# Patient Record
Sex: FEMALE | Race: BLACK OR AFRICAN AMERICAN | Employment: PART TIME | URBAN - METROPOLITAN AREA
[De-identification: names, ages, dates, MRNs, and addresses within clinical notes are randomized per-mention and may not be internally consistent; named-entity substitution may affect disease eponyms.]

---

## 2017-01-09 ENCOUNTER — OFFICE VISIT (OUTPATIENT)
Dept: SURGERY | Age: 27
End: 2017-01-09

## 2017-01-09 VITALS
DIASTOLIC BLOOD PRESSURE: 62 MMHG | BODY MASS INDEX: 24.59 KG/M2 | WEIGHT: 144 LBS | TEMPERATURE: 97.8 F | SYSTOLIC BLOOD PRESSURE: 100 MMHG | HEIGHT: 64 IN

## 2017-01-09 DIAGNOSIS — Z09 SURGERY FOLLOW-UP: Primary | ICD-10-CM

## 2017-01-09 PROCEDURE — 99024 POSTOP FOLLOW-UP VISIT: CPT | Performed by: SURGERY

## 2017-03-06 ENCOUNTER — OFFICE VISIT (OUTPATIENT)
Dept: INTERNAL MEDICINE | Age: 27
End: 2017-03-06

## 2017-03-06 VITALS
TEMPERATURE: 98.3 F | BODY MASS INDEX: 25.13 KG/M2 | HEART RATE: 87 BPM | WEIGHT: 147.2 LBS | DIASTOLIC BLOOD PRESSURE: 54 MMHG | OXYGEN SATURATION: 99 % | SYSTOLIC BLOOD PRESSURE: 108 MMHG | HEIGHT: 64 IN

## 2017-03-06 DIAGNOSIS — K42.9 UMBILICAL HERNIA WITHOUT OBSTRUCTION AND WITHOUT GANGRENE: Primary | ICD-10-CM

## 2017-03-06 DIAGNOSIS — Z11.4 SCREENING FOR HIV (HUMAN IMMUNODEFICIENCY VIRUS): ICD-10-CM

## 2017-03-06 DIAGNOSIS — Z23 NEED FOR TDAP VACCINATION: ICD-10-CM

## 2017-03-06 PROCEDURE — 90471 IMMUNIZATION ADMIN: CPT | Performed by: PHYSICIAN ASSISTANT

## 2017-03-06 PROCEDURE — 99203 OFFICE O/P NEW LOW 30 MIN: CPT | Performed by: PHYSICIAN ASSISTANT

## 2017-03-06 PROCEDURE — 90715 TDAP VACCINE 7 YRS/> IM: CPT | Performed by: PHYSICIAN ASSISTANT

## 2017-03-06 ASSESSMENT — ENCOUNTER SYMPTOMS
BLOOD IN STOOL: 0
HEARTBURN: 0
CONSTIPATION: 0
EYES NEGATIVE: 1
VOMITING: 0
DIARRHEA: 0
ABDOMINAL PAIN: 1
RESPIRATORY NEGATIVE: 1
NAUSEA: 0

## 2017-03-09 LAB — HIV-1 AND HIV-2 ANTIBODIES: NEGATIVE

## 2017-03-11 ENCOUNTER — OFFICE VISIT (OUTPATIENT)
Dept: INTERNAL MEDICINE | Age: 27
End: 2017-03-11

## 2017-03-11 VITALS
TEMPERATURE: 98.6 F | HEART RATE: 59 BPM | BODY MASS INDEX: 25.16 KG/M2 | OXYGEN SATURATION: 99 % | HEIGHT: 64 IN | WEIGHT: 147.4 LBS | SYSTOLIC BLOOD PRESSURE: 110 MMHG | DIASTOLIC BLOOD PRESSURE: 78 MMHG | RESPIRATION RATE: 16 BRPM

## 2017-03-11 DIAGNOSIS — D50.8 OTHER IRON DEFICIENCY ANEMIA: ICD-10-CM

## 2017-03-11 DIAGNOSIS — R68.82 DECREASED LIBIDO: Primary | ICD-10-CM

## 2017-03-11 DIAGNOSIS — E55.9 VITAMIN D DEFICIENCY: ICD-10-CM

## 2017-03-11 PROCEDURE — 99213 OFFICE O/P EST LOW 20 MIN: CPT | Performed by: PHYSICIAN ASSISTANT

## 2017-03-11 ASSESSMENT — PATIENT HEALTH QUESTIONNAIRE - PHQ9
1. LITTLE INTEREST OR PLEASURE IN DOING THINGS: 0
SUM OF ALL RESPONSES TO PHQ QUESTIONS 1-9: 0
SUM OF ALL RESPONSES TO PHQ9 QUESTIONS 1 & 2: 0
2. FEELING DOWN, DEPRESSED OR HOPELESS: 0

## 2017-03-11 ASSESSMENT — ENCOUNTER SYMPTOMS
VOMITING: 0
WHEEZING: 0
CONSTIPATION: 1
SHORTNESS OF BREATH: 0
DIARRHEA: 0
EYES NEGATIVE: 1
NAUSEA: 0
SORE THROAT: 0
COUGH: 0
ABDOMINAL PAIN: 0

## 2017-03-13 ENCOUNTER — OFFICE VISIT (OUTPATIENT)
Dept: SURGERY | Age: 27
End: 2017-03-13

## 2017-03-13 VITALS
DIASTOLIC BLOOD PRESSURE: 76 MMHG | TEMPERATURE: 97.9 F | BODY MASS INDEX: 24.92 KG/M2 | WEIGHT: 146 LBS | SYSTOLIC BLOOD PRESSURE: 112 MMHG | HEIGHT: 64 IN

## 2017-03-13 DIAGNOSIS — R68.82 DECREASED LIBIDO: ICD-10-CM

## 2017-03-13 DIAGNOSIS — E55.9 VITAMIN D DEFICIENCY: ICD-10-CM

## 2017-03-13 DIAGNOSIS — R10.33 PERIUMBILICAL ABDOMINAL PAIN: ICD-10-CM

## 2017-03-13 DIAGNOSIS — D50.8 OTHER IRON DEFICIENCY ANEMIA: ICD-10-CM

## 2017-03-13 DIAGNOSIS — R19.05 PERIUMBILICAL MASS: Primary | ICD-10-CM

## 2017-03-13 LAB
ESTRADIOL LEVEL: 50 PG/ML
FOLLICLE STIMULATING HORMONE: 4.2 MIU/ML
HCT VFR BLD CALC: 34.9 % (ref 37–47)
HEMOGLOBIN: 11.3 G/DL (ref 12–16)
LUTEINIZING HORMONE: 4 MIU/ML
MCH RBC QN AUTO: 27 PG (ref 27–31.3)
MCHC RBC AUTO-ENTMCNC: 32.4 % (ref 33–37)
MCV RBC AUTO: 83.2 FL (ref 82–100)
PDW BLD-RTO: 13.4 % (ref 11.5–14.5)
PLATELET # BLD: 283 K/UL (ref 130–400)
RBC # BLD: 4.19 M/UL (ref 4.2–5.4)
TSH SERPL DL<=0.05 MIU/L-ACNC: 2.26 UIU/ML (ref 0.27–4.2)
VITAMIN D 25-HYDROXY: 22.5 NG/ML (ref 30–100)
WBC # BLD: 4.1 K/UL (ref 4.8–10.8)

## 2017-03-13 PROCEDURE — 99213 OFFICE O/P EST LOW 20 MIN: CPT | Performed by: SURGERY

## 2017-03-15 LAB
SEX HORMONE BINDING GLOBULIN: 91 NMOL/L (ref 30–135)
TESTOSTERONE FREE: 1.2 PG/ML (ref 0.8–7.4)
TESTOSTERONE, FEMALES/CHILDREN: 14 NG/DL (ref 9–55)

## 2017-03-23 ENCOUNTER — HOSPITAL ENCOUNTER (OUTPATIENT)
Dept: CT IMAGING | Age: 27
Discharge: HOME | End: 2017-03-23
Payer: COMMERCIAL

## 2017-03-23 VITALS — BODY MASS INDEX: 24.75 KG/M2 | HEIGHT: 64 IN | WEIGHT: 145 LBS

## 2017-03-23 DIAGNOSIS — R19.05 PERIUMBILICAL MASS: ICD-10-CM

## 2017-03-23 PROCEDURE — 74177 CT ABD & PELVIS W/CONTRAST: CPT

## 2017-03-23 PROCEDURE — 6360000004 HC RX CONTRAST MEDICATION: Performed by: RADIOLOGY

## 2017-03-23 PROCEDURE — 2500000003 HC RX 250 WO HCPCS: Performed by: RADIOLOGY

## 2017-03-23 RX ADMIN — BARIUM SULFATE 450 ML: 21 SUSPENSION ORAL at 13:15

## 2017-03-23 RX ADMIN — IOPAMIDOL 100 ML: 755 INJECTION, SOLUTION INTRAVENOUS at 13:15

## 2017-04-03 ENCOUNTER — OFFICE VISIT (OUTPATIENT)
Dept: SURGERY | Age: 27
End: 2017-04-03

## 2017-04-03 VITALS
BODY MASS INDEX: 25.27 KG/M2 | DIASTOLIC BLOOD PRESSURE: 80 MMHG | HEIGHT: 64 IN | SYSTOLIC BLOOD PRESSURE: 118 MMHG | WEIGHT: 148 LBS | TEMPERATURE: 97.7 F

## 2017-04-03 DIAGNOSIS — R10.33 PERIUMBILICAL ABDOMINAL PAIN: Primary | ICD-10-CM

## 2017-04-03 PROCEDURE — 99213 OFFICE O/P EST LOW 20 MIN: CPT | Performed by: SURGERY

## 2017-06-08 ENCOUNTER — OFFICE VISIT (OUTPATIENT)
Dept: INTERNAL MEDICINE | Age: 27
End: 2017-06-08

## 2017-06-08 VITALS
SYSTOLIC BLOOD PRESSURE: 128 MMHG | HEIGHT: 64 IN | HEART RATE: 68 BPM | RESPIRATION RATE: 16 BRPM | WEIGHT: 151 LBS | TEMPERATURE: 98.3 F | BODY MASS INDEX: 25.78 KG/M2 | DIASTOLIC BLOOD PRESSURE: 78 MMHG

## 2017-06-08 DIAGNOSIS — H65.91 MIDDLE EAR EFFUSION, RIGHT: Primary | ICD-10-CM

## 2017-06-08 DIAGNOSIS — J30.2 SEASONAL ALLERGIC RHINITIS, UNSPECIFIED ALLERGIC RHINITIS TRIGGER: ICD-10-CM

## 2017-06-08 PROCEDURE — 99213 OFFICE O/P EST LOW 20 MIN: CPT | Performed by: PHYSICIAN ASSISTANT

## 2017-06-08 ASSESSMENT — ENCOUNTER SYMPTOMS
EYE PAIN: 0
SORE THROAT: 0
WHEEZING: 0
DIARRHEA: 0
VOMITING: 0
SHORTNESS OF BREATH: 0
HEARTBURN: 1
COUGH: 0
NAUSEA: 0
BLURRED VISION: 0
BACK PAIN: 0

## 2017-09-15 ENCOUNTER — OFFICE VISIT (OUTPATIENT)
Dept: INTERNAL MEDICINE | Age: 27
End: 2017-09-15

## 2017-09-15 VITALS
BODY MASS INDEX: 25.47 KG/M2 | HEART RATE: 53 BPM | WEIGHT: 149.2 LBS | SYSTOLIC BLOOD PRESSURE: 100 MMHG | OXYGEN SATURATION: 99 % | TEMPERATURE: 98.2 F | HEIGHT: 64 IN | DIASTOLIC BLOOD PRESSURE: 70 MMHG | RESPIRATION RATE: 16 BRPM

## 2017-09-15 DIAGNOSIS — K21.9 GASTROESOPHAGEAL REFLUX DISEASE WITHOUT ESOPHAGITIS: ICD-10-CM

## 2017-09-15 DIAGNOSIS — E55.9 VITAMIN D DEFICIENCY: ICD-10-CM

## 2017-09-15 DIAGNOSIS — K58.0 IRRITABLE BOWEL SYNDROME WITH DIARRHEA: Primary | ICD-10-CM

## 2017-09-15 DIAGNOSIS — D50.9 IRON DEFICIENCY ANEMIA, UNSPECIFIED IRON DEFICIENCY ANEMIA TYPE: ICD-10-CM

## 2017-09-15 DIAGNOSIS — F43.0 PANIC ATTACK AS REACTION TO STRESS: ICD-10-CM

## 2017-09-15 DIAGNOSIS — F41.0 PANIC ATTACK AS REACTION TO STRESS: ICD-10-CM

## 2017-09-15 DIAGNOSIS — R11.0 NAUSEA: ICD-10-CM

## 2017-09-15 DIAGNOSIS — H65.06 RECURRENT ACUTE SEROUS OTITIS MEDIA OF BOTH EARS: ICD-10-CM

## 2017-09-15 PROBLEM — J30.2 SEASONAL ALLERGIC RHINITIS: Status: ACTIVE | Noted: 2017-09-15

## 2017-09-15 PROCEDURE — 99214 OFFICE O/P EST MOD 30 MIN: CPT | Performed by: NURSE PRACTITIONER

## 2017-09-15 RX ORDER — ONDANSETRON 8 MG/1
8 TABLET, ORALLY DISINTEGRATING ORAL EVERY 8 HOURS PRN
Qty: 60 TABLET | Refills: 0 | Status: SHIPPED | OUTPATIENT
Start: 2017-09-15 | End: 2018-08-27 | Stop reason: SDUPTHER

## 2017-09-15 RX ORDER — DICYCLOMINE HCL 20 MG
20 TABLET ORAL 4 TIMES DAILY PRN
Qty: 120 TABLET | Refills: 3 | Status: SHIPPED | OUTPATIENT
Start: 2017-09-15 | End: 2020-01-10

## 2017-09-15 RX ORDER — CETIRIZINE HYDROCHLORIDE 10 MG/1
10 TABLET ORAL DAILY
Qty: 30 TABLET | Refills: 3 | Status: SHIPPED | OUTPATIENT
Start: 2017-09-15 | End: 2018-08-27

## 2017-09-15 RX ORDER — ERGOCALCIFEROL 1.25 MG/1
50000 CAPSULE ORAL WEEKLY
Qty: 8 CAPSULE | Refills: 0 | Status: SHIPPED | OUTPATIENT
Start: 2017-09-15 | End: 2018-08-27 | Stop reason: ALTCHOICE

## 2017-09-15 RX ORDER — PANTOPRAZOLE SODIUM 20 MG/1
20 TABLET, DELAYED RELEASE ORAL DAILY
Qty: 30 TABLET | Refills: 3 | Status: SHIPPED | OUTPATIENT
Start: 2017-09-15 | End: 2020-01-10

## 2017-09-15 ASSESSMENT — ENCOUNTER SYMPTOMS
BLOATING: 0
BLOOD IN STOOL: 0
DIARRHEA: 1
SWOLLEN GLANDS: 0
COUGH: 0
ABDOMINAL PAIN: 1
SORE THROAT: 0
FLATUS: 0
STRIDOR: 0
CHEST TIGHTNESS: 0
CONSTIPATION: 0
CHANGE IN BOWEL HABIT: 1
ANAL BLEEDING: 0
NAUSEA: 1
RECTAL PAIN: 0
VISUAL CHANGE: 0
VOMITING: 1
ABDOMINAL DISTENTION: 1

## 2017-09-22 ENCOUNTER — OFFICE VISIT (OUTPATIENT)
Dept: FAMILY MEDICINE CLINIC | Age: 27
End: 2017-09-22

## 2017-09-22 VITALS
TEMPERATURE: 98.6 F | RESPIRATION RATE: 16 BRPM | HEIGHT: 64 IN | WEIGHT: 150 LBS | HEART RATE: 74 BPM | DIASTOLIC BLOOD PRESSURE: 64 MMHG | BODY MASS INDEX: 25.61 KG/M2 | SYSTOLIC BLOOD PRESSURE: 98 MMHG | OXYGEN SATURATION: 98 %

## 2017-09-22 DIAGNOSIS — L25.9 CONTACT DERMATITIS, UNSPECIFIED CONTACT DERMATITIS TYPE, UNSPECIFIED TRIGGER: Primary | ICD-10-CM

## 2017-09-23 ASSESSMENT — ENCOUNTER SYMPTOMS
PERI-ORBITAL EDEMA: 0
SORE THROAT: 0
SHORTNESS OF BREATH: 0
ABDOMINAL PAIN: 0
THROAT SWELLING: 0
WHEEZING: 0
DIARRHEA: 0
NAUSEA: 0
HOARSE VOICE: 0
VOMITING: 0

## 2017-10-16 ENCOUNTER — OFFICE VISIT (OUTPATIENT)
Dept: INTERNAL MEDICINE | Age: 27
End: 2017-10-16

## 2017-10-16 VITALS
DIASTOLIC BLOOD PRESSURE: 72 MMHG | WEIGHT: 150.8 LBS | TEMPERATURE: 98.5 F | HEART RATE: 75 BPM | OXYGEN SATURATION: 99 % | SYSTOLIC BLOOD PRESSURE: 110 MMHG | HEIGHT: 64 IN | BODY MASS INDEX: 25.74 KG/M2 | RESPIRATION RATE: 16 BRPM

## 2017-10-16 DIAGNOSIS — K21.9 GASTROESOPHAGEAL REFLUX DISEASE WITHOUT ESOPHAGITIS: Primary | ICD-10-CM

## 2017-10-16 DIAGNOSIS — K58.2 IRRITABLE BOWEL SYNDROME WITH BOTH CONSTIPATION AND DIARRHEA: ICD-10-CM

## 2017-10-16 PROCEDURE — 99213 OFFICE O/P EST LOW 20 MIN: CPT | Performed by: PHYSICIAN ASSISTANT

## 2017-10-16 ASSESSMENT — ENCOUNTER SYMPTOMS
BLURRED VISION: 0
SORE THROAT: 0
CONSTIPATION: 0
EYE PAIN: 0
EYE DISCHARGE: 0
VOMITING: 0
SHORTNESS OF BREATH: 0
ABDOMINAL PAIN: 0
BACK PAIN: 0
DIARRHEA: 0
WHEEZING: 0
COUGH: 0
EYE REDNESS: 0
NAUSEA: 0
HEARTBURN: 0

## 2017-10-16 NOTE — PROGRESS NOTES
Occupational History    Not on file. Social History Main Topics    Smoking status: Never Smoker    Smokeless tobacco: Never Used    Alcohol use Yes      Comment: very rarely    Drug use: No    Sexual activity: Yes     Other Topics Concern    Not on file     Social History Narrative    No narrative on file     Family History   Problem Relation Age of Onset    Other Father     No Known Problems Sister     No Known Problems Son     No Known Problems Daughter     No Known Problems Son     No Known Problems Daughter      No Known Allergies  Current Outpatient Prescriptions   Medication Sig Dispense Refill    dicyclomine (BENTYL) 20 MG tablet Take 1 tablet by mouth 4 times daily as needed (diarrhea, abdominal cramping) 120 tablet 3    pantoprazole (PROTONIX) 20 MG tablet Take 1 tablet by mouth daily 30 tablet 3    ondansetron (ZOFRAN-ODT) 8 MG disintegrating tablet Take 1 tablet by mouth every 8 hours as needed for Nausea or Vomiting 60 tablet 0    cetirizine (ZYRTEC) 10 MG tablet Take 1 tablet by mouth daily 30 tablet 3    vitamin D (ERGOCALCIFEROL) 76458 units CAPS capsule Take 1 capsule by mouth once a week 8 capsule 0    [START ON 11/12/2017] Cholecalciferol (VITAMIN D3) 5000 units CAPS Take 1 capsule by mouth daily 30 capsule 5    iron polysaccharide complex-B12-folic acid (FERREX-FORTE) 150-0.025-1 MG CAPS capsule Take 1 capsule by mouth daily (with breakfast) 30 capsule 5     No current facility-administered medications for this visit. Objective    Vitals:    10/16/17 1007   BP: 110/72   Site: Left Arm   Position: Sitting   Cuff Size: Medium Adult   Pulse: 75   Resp: 16   Temp: 98.5 °F (36.9 °C)   TempSrc: Oral   SpO2: 99%   Weight: 150 lb 12.8 oz (68.4 kg)   Height: 5' 3.5\" (1.613 m)     Physical Exam   Constitutional: She is oriented to person, place, and time and well-developed, well-nourished, and in no distress.    Cardiovascular: Normal rate, regular rhythm and normal heart sounds. Pulmonary/Chest: Effort normal and breath sounds normal.   Abdominal: Soft. Bowel sounds are normal. There is no tenderness. Neurological: She is alert and oriented to person, place, and time. Assessment & Plan   1. Gastroesophageal reflux disease without esophagitis     2. Irritable bowel syndrome with both constipation and diarrhea       Increase water and fiber intake    No orders of the defined types were placed in this encounter. No orders of the defined types were placed in this encounter. There are no discontinued medications. Return if symptoms worsen or fail to improve.     Urszula Holland PA-C

## 2018-02-23 ENCOUNTER — OFFICE VISIT (OUTPATIENT)
Dept: FAMILY MEDICINE CLINIC | Age: 28
End: 2018-02-23
Payer: COMMERCIAL

## 2018-02-23 VITALS
RESPIRATION RATE: 16 BRPM | DIASTOLIC BLOOD PRESSURE: 62 MMHG | TEMPERATURE: 99.1 F | OXYGEN SATURATION: 98 % | BODY MASS INDEX: 24.59 KG/M2 | WEIGHT: 144 LBS | HEIGHT: 64 IN | SYSTOLIC BLOOD PRESSURE: 106 MMHG | HEART RATE: 92 BPM

## 2018-02-23 DIAGNOSIS — J10.1 INFLUENZA A: Primary | ICD-10-CM

## 2018-02-23 LAB
INFLUENZA A ANTIBODY: POSITIVE
INFLUENZA B ANTIBODY: NEGATIVE

## 2018-02-23 PROCEDURE — 99213 OFFICE O/P EST LOW 20 MIN: CPT | Performed by: NURSE PRACTITIONER

## 2018-02-23 PROCEDURE — 87804 INFLUENZA ASSAY W/OPTIC: CPT | Performed by: NURSE PRACTITIONER

## 2018-02-23 RX ORDER — OSELTAMIVIR PHOSPHATE 75 MG/1
75 CAPSULE ORAL 2 TIMES DAILY
Qty: 10 CAPSULE | Refills: 0 | Status: SHIPPED | OUTPATIENT
Start: 2018-02-23 | End: 2018-02-28

## 2018-02-23 RX ORDER — BENZONATATE 100 MG/1
100 CAPSULE ORAL 3 TIMES DAILY PRN
Qty: 21 CAPSULE | Refills: 0 | Status: SHIPPED | OUTPATIENT
Start: 2018-02-23 | End: 2018-03-02

## 2018-02-23 ASSESSMENT — ENCOUNTER SYMPTOMS
WHEEZING: 1
SINUS PRESSURE: 1
SHORTNESS OF BREATH: 1
HEMOPTYSIS: 0
HEARTBURN: 0
RHINORRHEA: 1
DIARRHEA: 0
VOMITING: 0
ABDOMINAL DISTENTION: 0
CONSTIPATION: 0
SORE THROAT: 0
SINUS PAIN: 0
NAUSEA: 1
CHEST TIGHTNESS: 0
TROUBLE SWALLOWING: 0
COUGH: 1
ABDOMINAL PAIN: 0

## 2018-02-23 NOTE — PROGRESS NOTES
Social History Main Topics    Smoking status: Never Smoker    Smokeless tobacco: Never Used    Alcohol use Yes      Comment: very rarely    Drug use: No    Sexual activity: Yes     Other Topics Concern    Not on file     Social History Narrative    No narrative on file     Family History   Problem Relation Age of Onset    Other Father     No Known Problems Sister     No Known Problems Son     No Known Problems Daughter     No Known Problems Son     No Known Problems Daughter      No Known Allergies  Current Outpatient Prescriptions   Medication Sig Dispense Refill    benzonatate (TESSALON) 100 MG capsule Take 1 capsule by mouth 3 times daily as needed for Cough 21 capsule 0    oseltamivir (TAMIFLU) 75 MG capsule Take 1 capsule by mouth 2 times daily for 5 days 10 capsule 0    cetirizine (ZYRTEC) 10 MG tablet Take 1 tablet by mouth daily 30 tablet 3    dicyclomine (BENTYL) 20 MG tablet Take 1 tablet by mouth 4 times daily as needed (diarrhea, abdominal cramping) 120 tablet 3    pantoprazole (PROTONIX) 20 MG tablet Take 1 tablet by mouth daily 30 tablet 3    ondansetron (ZOFRAN-ODT) 8 MG disintegrating tablet Take 1 tablet by mouth every 8 hours as needed for Nausea or Vomiting 60 tablet 0    vitamin D (ERGOCALCIFEROL) 82665 units CAPS capsule Take 1 capsule by mouth once a week 8 capsule 0    Cholecalciferol (VITAMIN D3) 5000 units CAPS Take 1 capsule by mouth daily 30 capsule 5    iron polysaccharide complex-B12-folic acid (FERREX-FORTE) 150-0.025-1 MG CAPS capsule Take 1 capsule by mouth daily (with breakfast) 30 capsule 5     No current facility-administered medications for this visit. PMH, Surgical Hx, Family Hx, and Social Hx reviewed and updated. Health Maintenance reviewed.     Objective    Vitals:    02/23/18 1046   BP: 106/62   Site: Right Arm   Position: Sitting   Cuff Size: Medium Adult   Pulse: 92   Resp: 16   Temp: 99.1 °F (37.3 °C)   TempSrc: Temporal   SpO2: 98%   Weight:

## 2018-08-27 ENCOUNTER — OFFICE VISIT (OUTPATIENT)
Dept: INTERNAL MEDICINE CLINIC | Age: 28
End: 2018-08-27
Payer: COMMERCIAL

## 2018-08-27 VITALS
RESPIRATION RATE: 16 BRPM | TEMPERATURE: 97.8 F | BODY MASS INDEX: 25.02 KG/M2 | DIASTOLIC BLOOD PRESSURE: 68 MMHG | HEIGHT: 63 IN | WEIGHT: 141.2 LBS | HEART RATE: 56 BPM | SYSTOLIC BLOOD PRESSURE: 116 MMHG | OXYGEN SATURATION: 99 %

## 2018-08-27 DIAGNOSIS — Z12.4 ENCOUNTER FOR PAPANICOLAOU SMEAR OF CERVIX: ICD-10-CM

## 2018-08-27 DIAGNOSIS — R11.0 NAUSEA: ICD-10-CM

## 2018-08-27 DIAGNOSIS — K58.0 IRRITABLE BOWEL SYNDROME WITH DIARRHEA: Primary | ICD-10-CM

## 2018-08-27 DIAGNOSIS — D50.9 IRON DEFICIENCY ANEMIA, UNSPECIFIED IRON DEFICIENCY ANEMIA TYPE: ICD-10-CM

## 2018-08-27 DIAGNOSIS — K21.9 GASTROESOPHAGEAL REFLUX DISEASE WITHOUT ESOPHAGITIS: ICD-10-CM

## 2018-08-27 DIAGNOSIS — R10.10 PAIN OF UPPER ABDOMEN: ICD-10-CM

## 2018-08-27 DIAGNOSIS — E53.8 B12 DEFICIENCY: ICD-10-CM

## 2018-08-27 DIAGNOSIS — E55.9 VITAMIN D DEFICIENCY: ICD-10-CM

## 2018-08-27 DIAGNOSIS — R10.2 PELVIC PAIN: ICD-10-CM

## 2018-08-27 DIAGNOSIS — Z00.00 ROUTINE PHYSICAL EXAMINATION: ICD-10-CM

## 2018-08-27 PROCEDURE — 99214 OFFICE O/P EST MOD 30 MIN: CPT | Performed by: PHYSICIAN ASSISTANT

## 2018-08-27 RX ORDER — OMEPRAZOLE 20 MG/1
20 CAPSULE, DELAYED RELEASE ORAL 2 TIMES DAILY
Qty: 60 CAPSULE | Refills: 3 | Status: SHIPPED | OUTPATIENT
Start: 2018-08-27 | End: 2020-01-10

## 2018-08-27 RX ORDER — PANTOPRAZOLE SODIUM 20 MG/1
20 TABLET, DELAYED RELEASE ORAL DAILY
Qty: 30 TABLET | Refills: 3 | Status: CANCELLED | OUTPATIENT
Start: 2018-08-27

## 2018-08-27 RX ORDER — ONDANSETRON 8 MG/1
8 TABLET, ORALLY DISINTEGRATING ORAL EVERY 8 HOURS PRN
Qty: 60 TABLET | Refills: 0 | Status: SHIPPED | OUTPATIENT
Start: 2018-08-27 | End: 2020-01-10

## 2018-08-27 ASSESSMENT — ENCOUNTER SYMPTOMS
SHORTNESS OF BREATH: 0
SORE THROAT: 0
WHEEZING: 0
VOMITING: 0
CONSTIPATION: 1
DIARRHEA: 1
COUGH: 0
ABDOMINAL PAIN: 0
BACK PAIN: 1
BLURRED VISION: 0
HEARTBURN: 0
NAUSEA: 1

## 2018-08-27 ASSESSMENT — PATIENT HEALTH QUESTIONNAIRE - PHQ9
SUM OF ALL RESPONSES TO PHQ QUESTIONS 1-9: 0
1. LITTLE INTEREST OR PLEASURE IN DOING THINGS: 0
SUM OF ALL RESPONSES TO PHQ QUESTIONS 1-9: 0
2. FEELING DOWN, DEPRESSED OR HOPELESS: 0
SUM OF ALL RESPONSES TO PHQ9 QUESTIONS 1 & 2: 0

## 2018-08-27 NOTE — PROGRESS NOTES
file.     Social History Main Topics    Smoking status: Never Smoker    Smokeless tobacco: Never Used    Alcohol use Yes      Comment: very rarely    Drug use: No    Sexual activity: Yes     Other Topics Concern    Not on file     Social History Narrative    No narrative on file     Family History   Problem Relation Age of Onset    Other Father     No Known Problems Sister     No Known Problems Son     No Known Problems Daughter     No Known Problems Son     No Known Problems Daughter      No Known Allergies  Current Outpatient Prescriptions   Medication Sig Dispense Refill    ondansetron (ZOFRAN-ODT) 8 MG TBDP disintegrating tablet Take 1 tablet by mouth every 8 hours as needed for Nausea or Vomiting 60 tablet 0    iron polysaccharide complex-B12-folic acid (FERREX-FORTE) 150-0.025-1 MG CAPS capsule Take 1 capsule by mouth daily (with breakfast) 30 capsule 5    vitamin D (D3-1000) 1000 units TABS tablet Take 1 tablet by mouth daily 30 tablet 5    omeprazole (PRILOSEC) 20 MG delayed release capsule Take 1 capsule by mouth 2 times daily 60 capsule 3    dicyclomine (BENTYL) 20 MG tablet Take 1 tablet by mouth 4 times daily as needed (diarrhea, abdominal cramping) 120 tablet 3    pantoprazole (PROTONIX) 20 MG tablet Take 1 tablet by mouth daily 30 tablet 3    Cholecalciferol (VITAMIN D3) 5000 units CAPS Take 1 capsule by mouth daily 30 capsule 5     No current facility-administered medications for this visit. Objective    Vitals:    08/27/18 0959   BP: 116/68   Site: Right Arm   Position: Sitting   Cuff Size: Medium Adult   Pulse: 56   Resp: 16   Temp: 97.8 °F (36.6 °C)   TempSrc: Oral   SpO2: 99%   Weight: 141 lb 3.2 oz (64 kg)   Height: 5' 3\" (1.6 m)     Physical Exam   Constitutional: She is well-developed, well-nourished, and in no distress. HENT:   Head: Normocephalic and atraumatic.    Right Ear: External ear normal.   Left Ear: External ear normal.   Nose: Nose normal.   Mouth/Throat: Oropharynx is clear and moist.   Eyes: Pupils are equal, round, and reactive to light. Conjunctivae and EOM are normal.   Neck: Normal range of motion. Neck supple. No thyromegaly present. Cardiovascular: Normal rate, regular rhythm and normal heart sounds. No murmur heard. Pulmonary/Chest: Effort normal and breath sounds normal.   Abdominal: Soft. Bowel sounds are normal.   Musculoskeletal: Normal range of motion. Lymphadenopathy:     She has no cervical adenopathy. Neurological: She is alert. Gait normal.   Skin: Skin is warm and dry. Psychiatric: Affect normal.            Assessment & Plan    Diagnosis Orders   1. Irritable bowel syndrome with diarrhea  Ambulatory referral to Gastroenterology    Symptomatic, RXs provided. Will reassess in 1 month to evaluate treatment. 2. Vitamin D deficiency  vitamin D (D3-1000) 1000 units TABS tablet    Vitamin D 25 Hydroxy    Replacement started based on blood work    3. Gastroesophageal reflux disease without esophagitis  Ambulatory referral to Gastroenterology    omeprazole (PRILOSEC) 20 MG delayed release capsule    see 1   4. Nausea  ondansetron (ZOFRAN-ODT) 8 MG TBDP disintegrating tablet    Ambulatory referral to Gastroenterology    see 1   5. Iron deficiency anemia, unspecified iron deficiency anemia type  iron polysaccharide complex-B12-folic acid (FERREX-FORTE) 150-0.025-1 MG CAPS capsule    see 6   6. Pelvic pain  US PELVIS COMPLETE    Amb External Referral To Ob-Gyn   7. Pain of upper abdomen     8. Encounter for Papanicolaou smear of cervix  US PELVIS COMPLETE    Amb External Referral To Ob-Gyn   9. B12 deficiency  Vitamin B12 & Folate   10.  Routine physical examination  Comprehensive Metabolic Panel    Lipid, Fasting    CBC With Auto Differential         Orders Placed This Encounter   Procedures    US PELVIS COMPLETE     Standing Status:   Future     Standing Expiration Date:   8/27/2019     Order Specific Question:   Reason for exam:     Answer: transvagnial if indicated    Vitamin D 25 Hydroxy     Standing Status:   Future     Standing Expiration Date:   8/27/2019    Vitamin B12 & Folate     Standing Status:   Future     Standing Expiration Date:   8/27/2019    Comprehensive Metabolic Panel     Standing Status:   Future     Standing Expiration Date:   8/27/2019    Lipid, Fasting     Standing Status:   Future     Standing Expiration Date:   8/27/2019    CBC With Auto Differential     Standing Status:   Future     Standing Expiration Date:   8/27/2019    Ambulatory referral to Gastroenterology     Referral Priority:   Routine     Referral Type:   Consult for Advice and Opinion     Referral Reason:   Specialty Services Required     Referred to Provider:   Rajesh Parra MD     Requested Specialty:   Gastroenterology     Number of Visits Requested:   1    Amb External Referral To Ob-Gyn     Referral Priority:   Routine     Referral Type:   Consult for Advice and Opinion     Requested Specialty:   Obstetrics & Gynecology     Number of Visits Requested:   1     Orders Placed This Encounter   Medications    ondansetron (ZOFRAN-ODT) 8 MG TBDP disintegrating tablet     Sig: Take 1 tablet by mouth every 8 hours as needed for Nausea or Vomiting     Dispense:  60 tablet     Refill:  0    iron polysaccharide complex-B12-folic acid (FERREX-FORTE) 150-0.025-1 MG CAPS capsule     Sig: Take 1 capsule by mouth daily (with breakfast)     Dispense:  30 capsule     Refill:  5    vitamin D (D3-1000) 1000 units TABS tablet     Sig: Take 1 tablet by mouth daily     Dispense:  30 tablet     Refill:  5    omeprazole (PRILOSEC) 20 MG delayed release capsule     Sig: Take 1 capsule by mouth 2 times daily     Dispense:  60 capsule     Refill:  3     Medications Discontinued During This Encounter   Medication Reason    cetirizine (ZYRTEC) 10 MG tablet Patient Choice    vitamin D (ERGOCALCIFEROL) 88516 units CAPS capsule Therapy completed    ondansetron (ZOFRAN-ODT) 8 MG disintegrating tablet REORDER    iron polysaccharide complex-B12-folic acid (FERREX-FORTE) 150-0.025-1 MG CAPS capsule REORDER     Return in about 1 year (around 8/27/2019), or if symptoms worsen or fail to improve, for call for results.   Keep f.u with ob-dgyn  Urszula Holland PA-C

## 2018-08-28 ENCOUNTER — OFFICE VISIT (OUTPATIENT)
Dept: GASTROENTEROLOGY | Age: 28
End: 2018-08-28
Payer: COMMERCIAL

## 2018-08-28 VITALS
DIASTOLIC BLOOD PRESSURE: 68 MMHG | SYSTOLIC BLOOD PRESSURE: 105 MMHG | BODY MASS INDEX: 24.8 KG/M2 | RESPIRATION RATE: 14 BRPM | HEIGHT: 63 IN | TEMPERATURE: 97.6 F | HEART RATE: 64 BPM | WEIGHT: 140 LBS

## 2018-08-28 DIAGNOSIS — K21.9 GASTROESOPHAGEAL REFLUX DISEASE WITHOUT ESOPHAGITIS: ICD-10-CM

## 2018-08-28 DIAGNOSIS — R12 HEARTBURN: Primary | ICD-10-CM

## 2018-08-28 PROCEDURE — 99203 OFFICE O/P NEW LOW 30 MIN: CPT | Performed by: INTERNAL MEDICINE

## 2018-08-28 NOTE — PROGRESS NOTES
Patricio Keenan  29 y.o. female  Referred by: Urszula Holland PA-C    Medicines, social history, past medical history, surgical history, and allergies have been reviewed and updated as needed. Chief Complaint   Patient presents with    Gastroesophageal Reflux    Heartburn         HPI:   Patient is a 29year old BF with significant reflux and heartburn. Omeprazole/Nexium have not helped that much. Protonix is more beneficial.  Patient also has periodic diarrhea that is usually related to the type of food. Patient has had an approximate 5 pound loss of weight over the past few months. Patient denies dysphagia. ROS:    CONSTITUTIONAL:  5 pound weight loss  EYES:  Negative  ENMT:  Headache  MUSCULOSKELETAL:  Negative  NEUROLOGICAL:  Negative  INTEGUMENTARY:  Negative  GASTROINTESTINAL:  See HPI  GENITOURINARY:  Negative  CARDIOVASCULAR:  Negative  RESPIRATORY:  Negative  HEM/LYMPH:  Negative  ENDOCRINE:  Negative  PSYCHIATRIC:  Anxiety  ALLERGIC/IMMUNO:  Negative  EXTREMITIES:  Negative  BREAST:  Negative        Physical Exam:  /68 (Site: Right Arm, Position: Sitting)   Pulse 64   Temp 97.6 °F (36.4 °C) (Temporal)   Resp 14   Ht 5' 3\" (1.6 m)   Wt 140 lb (63.5 kg)   LMP 08/11/2018   BMI 24.80 kg/m²   Constitutional:  Patient appears well nourished, well developed, and hydrated. Alert and oriented x3 and appropriate. Non icteric and not pale. Eyes:  Pupils equal and reactive. Oropharynx:  Unremarkable. Neck/Thyroid: Neck is supple. No palpable nodules. No carotid bruits. Thyroid is symmetrical, without thyromegaly, masses, or palpable nodules. Respiratory:  Normal to inspection. Lungs clear to auscultation and percussion. No wheezes rhonchi or rales. Cardiovascular:  Regular rate and rhythm. No murmurs, gallops, or rubs. Abdomen:  Soft, no tenderness and non-distended. No organomegaly. No masses. No rebound or guarding. Normal bowel sounds.   Integumentary:  The skin is unremarkable. No rashes. No suspicious lesions. Warm and dry. Neuro: Grossly intact. Cranial nerves, sensation and reflexes grossly intact. Musculoskeletal:  Unremarkable. Assessment:   Diagnosis Orders   1. Heartburn     2. Gastroesophageal reflux disease without esophagitis         Plan:  EGD 8/31/18      IVero, transcribed the above note for Dr Vernetta Goldberg. Electronically signed by Vero Macias 8/28/18 1:34 PM        I , Vernetta Goldberg, have read and agree with the above documentation.   Electronically signed by Vernetta Goldberg, M.D. 8/29/18 9:53 AM

## 2018-08-30 ENCOUNTER — ANESTHESIA EVENT (OUTPATIENT)
Dept: ENDOSCOPY | Age: 28
End: 2018-08-30
Payer: COMMERCIAL

## 2018-08-30 ENCOUNTER — TELEPHONE (OUTPATIENT)
Dept: INTERNAL MEDICINE CLINIC | Age: 28
End: 2018-08-30

## 2018-08-30 NOTE — ANESTHESIA PRE PROCEDURE
mouth daily 30 tablet 5    omeprazole (PRILOSEC) 20 MG delayed release capsule Take 1 capsule by mouth 2 times daily 60 capsule 3    dicyclomine (BENTYL) 20 MG tablet Take 1 tablet by mouth 4 times daily as needed (diarrhea, abdominal cramping) 120 tablet 3    pantoprazole (PROTONIX) 20 MG tablet Take 1 tablet by mouth daily 30 tablet 3    Cholecalciferol (VITAMIN D3) 5000 units CAPS Take 1 capsule by mouth daily 30 capsule 5       Allergies:  No Known Allergies    Problem List:    Patient Active Problem List   Diagnosis Code    Panic attack as reaction to stress F41.0, F43.0    Irritable bowel syndrome with diarrhea K58.0    Seasonal allergies J30.2    Vitamin D deficiency E55.9    Iron deficiency anemia D50.9       Past Medical History:        Diagnosis Date    Heart murmur     childhood    Umbilical hernia 6/9573       Past Surgical History:        Procedure Laterality Date    HERNIA REPAIR      TUBAL LIGATION      UMBILICAL HERNIA REPAIR N/A 12/2/2016    4.3 cm Ventralex mesh       Social History:    Social History   Substance Use Topics    Smoking status: Never Smoker    Smokeless tobacco: Never Used    Alcohol use Yes      Comment: very rarely                                Counseling given: Not Answered      Vital Signs (Current): There were no vitals filed for this visit.                                            BP Readings from Last 3 Encounters:   08/28/18 105/68   08/27/18 116/68   02/23/18 106/62       NPO Status:                                                                                 BMI:   Wt Readings from Last 3 Encounters:   08/28/18 140 lb (63.5 kg)   08/27/18 141 lb 3.2 oz (64 kg)   02/23/18 144 lb (65.3 kg)     There is no height or weight on file to calculate BMI.    CBC:   Lab Results   Component Value Date    WBC 4.1 03/13/2017    RBC 4.19 03/13/2017    HGB 11.3 03/13/2017    HCT 34.9 03/13/2017    MCV 83.2 03/13/2017    RDW 13.4 03/13/2017     03/13/2017 CMP:   Lab Results   Component Value Date     05/10/2016    K 4.4 05/10/2016     05/10/2016    CO2 22 05/10/2016    BUN 11 05/10/2016    CREATININE 0.50 05/10/2016    GFRAA >60.0 05/10/2016    LABGLOM >60.0 05/10/2016       POC Tests: No results for input(s): POCGLU, POCNA, POCK, POCCL, POCBUN, POCHEMO, POCHCT in the last 72 hours. Coags: No results found for: PROTIME, INR, APTT    HCG (If Applicable): No results found for: PREGTESTUR, PREGSERUM, HCG, HCGQUANT     ABGs: No results found for: PHART, PO2ART, JFX5KOJ, HVR6ZUA, BEART, H3WCEAFX     Type & Screen (If Applicable):  No results found for: [facility]    Anesthesia Evaluation  Patient summary reviewed and Nursing notes reviewed  Airway: Mallampati: II  TM distance: >3 FB   Neck ROM: full  Mouth opening: > = 3 FB Dental: normal exam         Pulmonary:Negative Pulmonary ROS and normal exam                               Cardiovascular:Negative CV ROS                      Neuro/Psych:   (+) psychiatric history:            GI/Hepatic/Renal:   (+) GERD:,           Endo/Other: Negative Endo/Other ROS                    Abdominal:           Vascular: negative vascular ROS. Anesthesia Plan      MAC     ASA 2       Induction: intravenous. Anesthetic plan and risks discussed with patient. Plan discussed with CRNA.                   JIA Harp - CRNA   8/30/2018

## 2018-08-31 ENCOUNTER — HOSPITAL ENCOUNTER (OUTPATIENT)
Age: 28
Setting detail: OUTPATIENT SURGERY
Discharge: HOME | End: 2018-08-31
Attending: INTERNAL MEDICINE | Admitting: INTERNAL MEDICINE
Payer: COMMERCIAL

## 2018-08-31 ENCOUNTER — ANESTHESIA (OUTPATIENT)
Dept: ENDOSCOPY | Age: 28
End: 2018-08-31
Payer: COMMERCIAL

## 2018-08-31 VITALS
SYSTOLIC BLOOD PRESSURE: 99 MMHG | OXYGEN SATURATION: 100 % | WEIGHT: 140 LBS | BODY MASS INDEX: 24.8 KG/M2 | HEART RATE: 72 BPM | TEMPERATURE: 97.8 F | HEIGHT: 63 IN | RESPIRATION RATE: 16 BRPM | DIASTOLIC BLOOD PRESSURE: 55 MMHG

## 2018-08-31 VITALS
OXYGEN SATURATION: 100 % | SYSTOLIC BLOOD PRESSURE: 100 MMHG | DIASTOLIC BLOOD PRESSURE: 64 MMHG | RESPIRATION RATE: 28 BRPM

## 2018-08-31 PROCEDURE — 7100000010 HC PHASE II RECOVERY - FIRST 15 MIN: Performed by: INTERNAL MEDICINE

## 2018-08-31 PROCEDURE — 43239 EGD BIOPSY SINGLE/MULTIPLE: CPT | Performed by: INTERNAL MEDICINE

## 2018-08-31 PROCEDURE — 3700000000 HC ANESTHESIA ATTENDED CARE: Performed by: INTERNAL MEDICINE

## 2018-08-31 PROCEDURE — 6370000000 HC RX 637 (ALT 250 FOR IP): Performed by: INTERNAL MEDICINE

## 2018-08-31 PROCEDURE — 88342 IMHCHEM/IMCYTCHM 1ST ANTB: CPT

## 2018-08-31 PROCEDURE — 88305 TISSUE EXAM BY PATHOLOGIST: CPT

## 2018-08-31 PROCEDURE — 3609017100 HC EGD: Performed by: INTERNAL MEDICINE

## 2018-08-31 PROCEDURE — 6360000002 HC RX W HCPCS: Performed by: NURSE ANESTHETIST, CERTIFIED REGISTERED

## 2018-08-31 PROCEDURE — 2500000003 HC RX 250 WO HCPCS: Performed by: NURSE ANESTHETIST, CERTIFIED REGISTERED

## 2018-08-31 PROCEDURE — 2580000003 HC RX 258: Performed by: NURSE ANESTHETIST, CERTIFIED REGISTERED

## 2018-08-31 PROCEDURE — 3700000001 HC ADD 15 MINUTES (ANESTHESIA): Performed by: INTERNAL MEDICINE

## 2018-08-31 PROCEDURE — 2580000003 HC RX 258: Performed by: INTERNAL MEDICINE

## 2018-08-31 RX ORDER — PROPOFOL 10 MG/ML
INJECTION, EMULSION INTRAVENOUS PRN
Status: DISCONTINUED | OUTPATIENT
Start: 2018-08-31 | End: 2018-08-31 | Stop reason: SDUPTHER

## 2018-08-31 RX ORDER — SODIUM CHLORIDE 9 MG/ML
INJECTION, SOLUTION INTRAVENOUS CONTINUOUS PRN
Status: DISCONTINUED | OUTPATIENT
Start: 2018-08-31 | End: 2018-08-31 | Stop reason: SDUPTHER

## 2018-08-31 RX ORDER — SODIUM CHLORIDE 9 MG/ML
INJECTION, SOLUTION INTRAVENOUS CONTINUOUS
Status: DISCONTINUED | OUTPATIENT
Start: 2018-08-31 | End: 2018-08-31 | Stop reason: HOSPADM

## 2018-08-31 RX ORDER — ONDANSETRON 2 MG/ML
4 INJECTION INTRAMUSCULAR; INTRAVENOUS
Status: DISCONTINUED | OUTPATIENT
Start: 2018-08-31 | End: 2018-08-31 | Stop reason: HOSPADM

## 2018-08-31 RX ORDER — LIDOCAINE HYDROCHLORIDE 20 MG/ML
INJECTION, SOLUTION INFILTRATION; PERINEURAL PRN
Status: DISCONTINUED | OUTPATIENT
Start: 2018-08-31 | End: 2018-08-31 | Stop reason: SDUPTHER

## 2018-08-31 RX ADMIN — PROPOFOL 20 MG: 10 INJECTION, EMULSION INTRAVENOUS at 08:33

## 2018-08-31 RX ADMIN — PROPOFOL 50 MG: 10 INJECTION, EMULSION INTRAVENOUS at 08:23

## 2018-08-31 RX ADMIN — PROPOFOL 20 MG: 10 INJECTION, EMULSION INTRAVENOUS at 08:26

## 2018-08-31 RX ADMIN — LIDOCAINE HYDROCHLORIDE 40 MG: 20 INJECTION, SOLUTION INFILTRATION; PERINEURAL at 08:23

## 2018-08-31 RX ADMIN — PROPOFOL 50 MG: 10 INJECTION, EMULSION INTRAVENOUS at 08:30

## 2018-08-31 RX ADMIN — SODIUM CHLORIDE: 9 INJECTION, SOLUTION INTRAVENOUS at 08:19

## 2018-08-31 RX ADMIN — SODIUM CHLORIDE: 9 INJECTION, SOLUTION INTRAVENOUS at 07:59

## 2018-08-31 RX ADMIN — PROPOFOL 50 MG: 10 INJECTION, EMULSION INTRAVENOUS at 08:24

## 2018-08-31 RX ADMIN — LIDOCAINE HYDROCHLORIDE 15 ML: 20 SOLUTION ORAL; TOPICAL at 08:21

## 2018-08-31 RX ADMIN — PROPOFOL 50 MG: 10 INJECTION, EMULSION INTRAVENOUS at 08:27

## 2018-08-31 ASSESSMENT — PAIN - FUNCTIONAL ASSESSMENT: PAIN_FUNCTIONAL_ASSESSMENT: 0-10

## 2018-09-04 ENCOUNTER — HOSPITAL ENCOUNTER (OUTPATIENT)
Dept: ULTRASOUND IMAGING | Age: 28
Discharge: HOME | End: 2018-09-06
Payer: COMMERCIAL

## 2018-09-04 DIAGNOSIS — R10.2 PELVIC PAIN: ICD-10-CM

## 2018-09-04 DIAGNOSIS — Z12.4 ENCOUNTER FOR PAPANICOLAOU SMEAR OF CERVIX: ICD-10-CM

## 2018-09-04 PROCEDURE — 76856 US EXAM PELVIC COMPLETE: CPT

## 2018-09-04 PROCEDURE — 76830 TRANSVAGINAL US NON-OB: CPT

## 2018-09-11 ENCOUNTER — OFFICE VISIT (OUTPATIENT)
Dept: GASTROENTEROLOGY | Age: 28
End: 2018-09-11
Payer: COMMERCIAL

## 2018-09-11 VITALS
HEART RATE: 48 BPM | DIASTOLIC BLOOD PRESSURE: 78 MMHG | WEIGHT: 144 LBS | SYSTOLIC BLOOD PRESSURE: 114 MMHG | BODY MASS INDEX: 25.51 KG/M2

## 2018-09-11 DIAGNOSIS — K21.9 GASTROESOPHAGEAL REFLUX DISEASE WITHOUT ESOPHAGITIS: ICD-10-CM

## 2018-09-11 DIAGNOSIS — R12 HEARTBURN: Primary | ICD-10-CM

## 2018-09-11 PROCEDURE — 99212 OFFICE O/P EST SF 10 MIN: CPT | Performed by: INTERNAL MEDICINE

## 2018-09-24 ENCOUNTER — OFFICE VISIT (OUTPATIENT)
Dept: OBGYN CLINIC | Age: 28
End: 2018-09-24
Payer: COMMERCIAL

## 2018-09-24 VITALS
BODY MASS INDEX: 24.14 KG/M2 | HEIGHT: 64 IN | DIASTOLIC BLOOD PRESSURE: 72 MMHG | SYSTOLIC BLOOD PRESSURE: 114 MMHG | WEIGHT: 141.4 LBS

## 2018-09-24 DIAGNOSIS — R10.2 PELVIC PAIN IN FEMALE: ICD-10-CM

## 2018-09-24 DIAGNOSIS — Z01.419 WELL WOMAN EXAM WITH ROUTINE GYNECOLOGICAL EXAM: Primary | ICD-10-CM

## 2018-09-24 DIAGNOSIS — Z11.51 SCREENING FOR HPV (HUMAN PAPILLOMAVIRUS): ICD-10-CM

## 2018-09-24 PROCEDURE — 99203 OFFICE O/P NEW LOW 30 MIN: CPT | Performed by: OBSTETRICS & GYNECOLOGY

## 2018-09-24 PROCEDURE — 99385 PREV VISIT NEW AGE 18-39: CPT | Performed by: OBSTETRICS & GYNECOLOGY

## 2018-09-24 RX ORDER — IBUPROFEN 600 MG/1
TABLET ORAL
Refills: 0 | COMMUNITY
Start: 2018-09-21 | End: 2020-01-10 | Stop reason: SDUPTHER

## 2018-09-24 RX ORDER — IBUPROFEN 800 MG/1
800 TABLET ORAL EVERY 6 HOURS PRN
Qty: 40 TABLET | Refills: 1 | Status: SHIPPED | OUTPATIENT
Start: 2018-09-24 | End: 2021-04-15

## 2018-09-24 ASSESSMENT — ENCOUNTER SYMPTOMS
SINUS PRESSURE: 0
DIARRHEA: 0
ABDOMINAL PAIN: 0
COLOR CHANGE: 0
VOMITING: 0
SHORTNESS OF BREATH: 0
COUGH: 0
WHEEZING: 0
NAUSEA: 0
CONSTIPATION: 0
BLOOD IN STOOL: 0

## 2018-09-24 ASSESSMENT — PATIENT HEALTH QUESTIONNAIRE - PHQ9
SUM OF ALL RESPONSES TO PHQ QUESTIONS 1-9: 0
2. FEELING DOWN, DEPRESSED OR HOPELESS: 0
1. LITTLE INTEREST OR PLEASURE IN DOING THINGS: 0
SUM OF ALL RESPONSES TO PHQ QUESTIONS 1-9: 0
SUM OF ALL RESPONSES TO PHQ9 QUESTIONS 1 & 2: 0

## 2018-09-24 NOTE — PROGRESS NOTES
History and Physical  [facility] [address]  P: [phone] / F: [phone]  Rajeev Manning  2018              29 y.o. Chief Complaint   Patient presents with    Annual Exam     last pap 2016,wnl     Results     pt had US done with PCP due to low pelvic pain x1 month     Student     ok        /72   Ht 5' 3.5\" (1.613 m)   Wt 141 lb 6.4 oz (64.1 kg)   LMP 2018   BMI 24.65 kg/m²   Alllergies:  Patient has no known allergies. Primary Care Physician: Tiesha Zepeda PA-C    HPI : Rajeev Manning is a 29 y.o. female  The patient was seen and examined. She has no chief complaint today and is here for her annual exam.  Her bowels are regular. There are no voiding complaints. She denies any bloating. She denies vaginal discharge and was counseled on STD's and the need for barrier contraception. All ?s answered. Pt would also like to discuss lower pelvic pain that she has had for the last two month. Pt denies any abnormal bleeding, change in sexual partners, vaginal burning or discharge. Pt had pelvic sono that was ordered by her PCP. Pelvic sono showed    NORMAL SIZE UTERUS. NO DEFINITE UTERINE ABNORMALITY.       1.5 CM HYPOECHOIC LESION RIGHT OVARY, SUSPECTED COMPLEX CORPUS LUTEAL CYST.       Pt does report she had an EGD last month and was dx with a hiatal hernia. On exam pt has right side periumbilical pain. Pt with hx of umbilical hernia pt advised of risks in developing another hernia. No hernia identified today on exam. Urine culture collected to rule out infection. Risks, benefits and alternative therapies for treatment discussed. Pt is a  and is likely experiencing discomfort due to mesh associated with her previous hernia surgery. Pt advised motrin 800 mg to treat the discomfort. Pt advised to get an abdominal binder with back support.  Pt advised to follow up with complex-B12-folic acid (FERREX-FORTE) 150-0.025-1 MG CAPS capsule Take 1 capsule by mouth daily (with breakfast) 30 capsule 5    omeprazole (PRILOSEC) 20 MG delayed release capsule Take 1 capsule by mouth 2 times daily 60 capsule 3    dicyclomine (BENTYL) 20 MG tablet Take 1 tablet by mouth 4 times daily as needed (diarrhea, abdominal cramping) 120 tablet 3    pantoprazole (PROTONIX) 20 MG tablet Take 1 tablet by mouth daily 30 tablet 3    Cholecalciferol (VITAMIN D3) 5000 units CAPS Take 1 capsule by mouth daily 30 capsule 5     No current facility-administered medications on file prior to visit. ALLERGIES:  Allergies as of 09/24/2018    (No Known Allergies)           Gynecologic History:     Patient's last menstrual period was 09/08/2018.      ________________________________________________________________________  REVIEW OF SYSTEMS:       Review of Systems   Constitutional: Negative for chills, fatigue, fever and unexpected weight change. HENT: Negative for hearing loss, sinus pressure and tinnitus. Eyes: Negative for visual disturbance. Respiratory: Negative for cough, shortness of breath and wheezing. Cardiovascular: Negative for chest pain, palpitations and leg swelling. Gastrointestinal: Negative for abdominal pain, blood in stool, constipation, diarrhea, nausea and vomiting. Genitourinary: Positive for pelvic pain. Negative for difficulty urinating, dysuria, flank pain, hematuria and vaginal discharge. Musculoskeletal: Negative for arthralgias and neck stiffness. Skin: Negative. Negative for color change, pallor and rash. Allergic/Immunologic: Negative for food allergies. Neurological: Negative for dizziness, speech difficulty, weakness and numbness. Psychiatric/Behavioral: Negative. Negative for behavioral problems, self-injury and suicidal ideas. The patient is not nervous/anxious. Done.          Tobacco & Secondary smoke risks reviewed; instructed on cessation and avoidance      Counseling Completed:          PLAN:    No Follow-up on file. Repeat Annual every 1 year  Cervical Cytology Evaluation begins at 24years old. If Negative Cytology, Follow-up screening per current guidelines. Mammograms every 1 year. If 37 yo and last mammogram was negative. Calcium and Vitamin D dosing reviewed. Colonoscopy screening reviewed as well as onset for bone density testing. Birth control and barrier recommendations discussed. STD counseling and prevention reviewed. Gardisil counseling completed for all patients 7-33 yo. Routine health maintenance per patients PCP. Orders Placed This Encounter   Procedures    PAP SMEAR     Patient History:    Patient's last menstrual period was 09/08/2018. OBGYN Status: Having periods  Past Surgical History:  No date: HERNIA REPAIR  8/31/2018: NM ESOPHAGOGASTRODUODENOSCOPY TRANSORAL DIAGNO* N/A      Comment: EGD ESOPHAGOGASTRODUODENOSCOPY performed by                Yong Dallas MD at [facility]  No date: TUBAL LIGATION  70/0/9813: UMBILICAL HERNIA REPAIR N/A      Comment: 4.3 cm Ventralex mesh      Smoking status: Never Smoker                                                              Smokeless tobacco: Never Used                           Standing Status:   Future     Standing Expiration Date:   9/24/2019     Order Specific Question:   Collection Type     Answer: Thin Prep     Order Specific Question:   Prior Abnormal Pap Test     Answer:   No     Order Specific Question:   Screening or Diagnostic     Answer:   Screening     Order Specific Question:   HPV Requested? Answer:   Yes     Comments:   16/18     Order Specific Question:   High Risk Patient     Answer:   N/A     No orders of the defined types were placed in this encounter. Sumanth GALLEGO, am scribing for, and in the presence of, Dr Ezra Candelaria.  Electronically

## 2018-10-02 DIAGNOSIS — Z01.419 WELL WOMAN EXAM WITH ROUTINE GYNECOLOGICAL EXAM: ICD-10-CM

## 2018-10-02 DIAGNOSIS — Z11.51 SCREENING FOR HPV (HUMAN PAPILLOMAVIRUS): ICD-10-CM

## 2018-10-11 ENCOUNTER — OFFICE VISIT (OUTPATIENT)
Dept: GASTROENTEROLOGY | Age: 28
End: 2018-10-11
Payer: COMMERCIAL

## 2018-10-11 VITALS
BODY MASS INDEX: 24.76 KG/M2 | SYSTOLIC BLOOD PRESSURE: 116 MMHG | DIASTOLIC BLOOD PRESSURE: 70 MMHG | WEIGHT: 142 LBS | HEART RATE: 61 BPM

## 2018-10-11 DIAGNOSIS — R12 HEARTBURN: Primary | ICD-10-CM

## 2018-10-11 DIAGNOSIS — K21.9 GASTROESOPHAGEAL REFLUX DISEASE WITHOUT ESOPHAGITIS: ICD-10-CM

## 2018-10-11 PROCEDURE — 99212 OFFICE O/P EST SF 10 MIN: CPT | Performed by: INTERNAL MEDICINE

## 2019-05-20 ENCOUNTER — OFFICE VISIT (OUTPATIENT)
Dept: FAMILY MEDICINE CLINIC | Age: 29
End: 2019-05-20
Payer: COMMERCIAL

## 2019-05-20 VITALS
OXYGEN SATURATION: 99 % | HEIGHT: 63 IN | BODY MASS INDEX: 23.57 KG/M2 | SYSTOLIC BLOOD PRESSURE: 122 MMHG | DIASTOLIC BLOOD PRESSURE: 64 MMHG | TEMPERATURE: 97.3 F | RESPIRATION RATE: 15 BRPM | HEART RATE: 81 BPM | WEIGHT: 133 LBS

## 2019-05-20 DIAGNOSIS — J02.9 SORE THROAT: ICD-10-CM

## 2019-05-20 DIAGNOSIS — R68.89 FLU-LIKE SYMPTOMS: ICD-10-CM

## 2019-05-20 DIAGNOSIS — H66.001 ACUTE SUPPURATIVE OTITIS MEDIA OF RIGHT EAR WITHOUT SPONTANEOUS RUPTURE OF TYMPANIC MEMBRANE, RECURRENCE NOT SPECIFIED: Primary | ICD-10-CM

## 2019-05-20 LAB
INFLUENZA A ANTIBODY: NORMAL
INFLUENZA B ANTIBODY: NORMAL
S PYO AG THROAT QL: NORMAL

## 2019-05-20 PROCEDURE — 99213 OFFICE O/P EST LOW 20 MIN: CPT | Performed by: NURSE PRACTITIONER

## 2019-05-20 PROCEDURE — 87880 STREP A ASSAY W/OPTIC: CPT | Performed by: NURSE PRACTITIONER

## 2019-05-20 PROCEDURE — 87804 INFLUENZA ASSAY W/OPTIC: CPT | Performed by: NURSE PRACTITIONER

## 2019-05-20 RX ORDER — CEFDINIR 300 MG/1
300 CAPSULE ORAL 2 TIMES DAILY
Qty: 20 CAPSULE | Refills: 0 | Status: SHIPPED | OUTPATIENT
Start: 2019-05-20 | End: 2019-05-30

## 2019-05-20 RX ORDER — CEFDINIR 300 MG/1
300 CAPSULE ORAL 2 TIMES DAILY
Qty: 20 CAPSULE | Refills: 0 | Status: SHIPPED | OUTPATIENT
Start: 2019-05-20 | End: 2019-05-20 | Stop reason: SDUPTHER

## 2019-05-20 ASSESSMENT — PATIENT HEALTH QUESTIONNAIRE - PHQ9
SUM OF ALL RESPONSES TO PHQ QUESTIONS 1-9: 0
SUM OF ALL RESPONSES TO PHQ QUESTIONS 1-9: 0
2. FEELING DOWN, DEPRESSED OR HOPELESS: 0
1. LITTLE INTEREST OR PLEASURE IN DOING THINGS: 0
SUM OF ALL RESPONSES TO PHQ9 QUESTIONS 1 & 2: 0

## 2019-05-20 ASSESSMENT — ENCOUNTER SYMPTOMS
WHEEZING: 0
NAUSEA: 1
VOMITING: 1
SORE THROAT: 1
COUGH: 0

## 2019-05-20 NOTE — PROGRESS NOTES
Subjective  Chief Complaint   Patient presents with    Generalized Body Aches     x2days     Fever     spiked at 101.4    Pharyngitis     feels like a knot in her throat, hurt to swallow     Dewitte Mily is a 29 y.o. female who presents for evaluation of fever. She has had the fever for 2 days. Symptoms have been gradually worsening. Symptoms are described as fevers up to 101.4 degrees, \"knot\" in her throat, pain with swallowing, myalgias, headache, R ear pain. Associated symptoms are vomiting. Pt denies diarrhea, rash, and urinary tract symptoms. Pt has tried to alleviate the symptoms with ibuprofen with moderate relief. She was on PCN 4x/day x 7d 2/2 gum infection. Finished abx last Thursday. Other reported history is heart murmur. Patient's medications, allergies, past medical, surgical, social and family histories were reviewed and updated as appropriate. Review of Systems   Constitutional: Positive for chills, fatigue and fever. HENT: Positive for congestion, ear pain, sore throat and trouble swallowing (painful). Negative for mouth sores, nosebleeds, postnasal drip and rhinorrhea. Respiratory: Negative for cough, chest tightness and wheezing. Cardiovascular: Negative for chest pain. Gastrointestinal: Positive for nausea and vomiting. Negative for abdominal pain and diarrhea. Genitourinary: Negative for dysuria. Musculoskeletal: Positive for myalgias. Skin: Negative. Neurological: Positive for headaches. Negative for dizziness and light-headedness. Hematological: Positive for adenopathy. Objective  Vitals:    05/20/19 1207   BP: 122/64   Site: Left Upper Arm   Position: Sitting   Cuff Size: Medium Adult   Pulse: 81   Resp: 15   Temp: 97.3 °F (36.3 °C)   TempSrc: Temporal   SpO2: 99%   Weight: 133 lb (60.3 kg)   Height: 5' 3\" (1.6 m)     Physical Exam   Constitutional: She is oriented to person, place, and time. She appears well-developed and well-nourished.  She is cooperative. No distress. hyponasal voice, thick speech   HENT:   Head: Normocephalic and atraumatic. Right Ear: External ear and ear canal normal. No tenderness. No mastoid tenderness. Tympanic membrane is erythematous. A middle ear effusion is present. Left Ear: External ear and ear canal normal. No tenderness. No mastoid tenderness. A middle ear effusion is present. Nose: Mucosal edema and rhinorrhea present. Mouth/Throat: Uvula is midline and mucous membranes are normal. No oral lesions. No trismus in the jaw. Normal dentition. No dental abscesses. Posterior oropharyngeal erythema present. No oropharyngeal exudate. Tonsils are 1+ on the right. Tonsils are 1+ on the left. No tonsillar exudate. Eyes: Pupils are equal, round, and reactive to light. Conjunctivae, EOM and lids are normal.   Neck: Trachea normal and normal range of motion. Neck supple. No tracheal deviation present. Cardiovascular: Normal rate, regular rhythm, S1 normal and S2 normal.   Pulmonary/Chest: Effort normal and breath sounds normal. No tachypnea. No respiratory distress. She has no wheezes. She has no rales. She exhibits no tenderness. Musculoskeletal: Normal range of motion. Lymphadenopathy:        Head (right side): Submandibular and tonsillar adenopathy present. Head (left side): Submandibular and tonsillar adenopathy present. She has cervical adenopathy. Right: No supraclavicular adenopathy present. Left: No supraclavicular adenopathy present. Neurological: She is alert and oriented to person, place, and time. Coordination and gait normal.   Skin: Skin is warm and dry. Capillary refill takes less than 2 seconds. No rash noted. Psychiatric: She has a normal mood and affect. Her speech is normal and behavior is normal.   Vitals reviewed.      POC Testing Today:   Results for POC orders placed in visit on 05/20/19   POCT Influenza A/B   Result Value Ref Range    Influenza A Ab -     Influenza B Ab -    POCT rapid strep A   Result Value Ref Range    Strep A Ag None Detected None Detected     Assessment & Plan   28 y. o.female presenting with 2 days of worsening sore throat and fever. Exam suggestive of likely strep pharyngitis and acute R AOM. Rapid strep test negative. Plan: throat culture, Rx cefdinir, instructions for supportive care, return precautions. Diagnoses and all orders for this visit:    Acute suppurative otitis media of right ear without spontaneous rupture of tympanic membrane, recurrence not specified  -     cefdinir (OMNICEF) 300 MG capsule; Take 1 capsule by mouth 2 times daily for 10 days   - Supportive care with appropriate antipyretics/analgesics and fluids  - Antibiotics as per orders  - Call/return if symptoms fail to improve in 2-3 days or worsen in any way    Sore throat  -     POCT rapid strep A  -     Throat culture; Future    Flu-like symptoms  -     POCT Influenza A/B    Antibiotic Instructions: Complete the full course of antibiotics as ordered. Take each dose with a small snack or meal to lessen potential GI upset. To prevent antibiotic resistance, please take medication as ordered and for the full duration even if you start to feel better. Consider intake of yogurt or probiotic during antibiotic use and for a few days after to help reduce the risk of developing a secondary infection. Separate the yogurt and antibiotic by at least 1 hour. Avoid alcohol while taking antibiotics. Return if symptoms worsen or fail to improve, for follow-up with your Primary Care Provider. Side effects and adverse effects of any medication prescribed today, as well as treatment plan/rationale, follow-up care, and result expectations have been discussed with the patient. Poonam Ramey expresses understanding and wishes to proceed with the plan. Discussed signs and symptoms which require immediate follow-up in ED/call to 911. Understanding verbalized.     I have reviewed and updated the

## 2019-05-20 NOTE — PATIENT INSTRUCTIONS
medical care if:    · You have a new or higher fever.     · You have a fever with a stiff neck or severe headache.     · You have new or worse trouble swallowing.     · Your sore throat gets much worse on one side.     · Your pain becomes much worse on one side of your throat.    Watch closely for changes in your health, and be sure to contact your doctor if:    · You are not getting better after 2 days (48 hours).     · You do not get better as expected. Where can you learn more? Go to [email or web address] and sign in to your [employer] account. Enter K625 in the [employer] box to learn more about \"Strep Throat: Care Instructions. \"     If you do not have an account, please click on the \"Sign Up Now\" link. Current as of: October 21, 2018  Content Version: 12.0  © 0298-1484 [employer]wise, Incorporated. Care instructions adapted under license by [facility] ([facility]). If you have questions about a medical condition or this instruction, always ask your healthcare professional. [address] any warranty or liability for your use of this information.

## 2019-05-23 LAB
ORGANISM: ABNORMAL
THROAT CULTURE: ABNORMAL
THROAT CULTURE: ABNORMAL

## 2019-05-28 ASSESSMENT — ENCOUNTER SYMPTOMS
ABDOMINAL PAIN: 0
DIARRHEA: 0
RHINORRHEA: 0
TROUBLE SWALLOWING: 1
CHEST TIGHTNESS: 0

## 2019-11-08 ENCOUNTER — OFFICE VISIT (OUTPATIENT)
Dept: FAMILY MEDICINE CLINIC | Age: 29
End: 2019-11-08

## 2019-11-08 VITALS
HEIGHT: 63 IN | OXYGEN SATURATION: 98 % | WEIGHT: 138 LBS | SYSTOLIC BLOOD PRESSURE: 108 MMHG | DIASTOLIC BLOOD PRESSURE: 66 MMHG | TEMPERATURE: 98.5 F | HEART RATE: 76 BPM | BODY MASS INDEX: 24.45 KG/M2 | RESPIRATION RATE: 16 BRPM

## 2019-11-08 DIAGNOSIS — H65.191 ACUTE OTITIS MEDIA WITH EFFUSION OF RIGHT EAR: Primary | ICD-10-CM

## 2019-11-08 DIAGNOSIS — R68.89 FLU-LIKE SYMPTOMS: ICD-10-CM

## 2019-11-08 LAB
INFLUENZA A ANTIBODY: NORMAL
INFLUENZA B ANTIBODY: NORMAL

## 2019-11-08 PROCEDURE — 99212 OFFICE O/P EST SF 10 MIN: CPT | Performed by: NURSE PRACTITIONER

## 2019-11-08 PROCEDURE — 87804 INFLUENZA ASSAY W/OPTIC: CPT | Performed by: NURSE PRACTITIONER

## 2019-11-08 RX ORDER — AMOXICILLIN 875 MG/1
875 TABLET, COATED ORAL 2 TIMES DAILY
Qty: 14 TABLET | Refills: 0 | Status: SHIPPED | OUTPATIENT
Start: 2019-11-08 | End: 2019-11-15

## 2019-11-08 ASSESSMENT — ENCOUNTER SYMPTOMS
SINUS PAIN: 0
ABDOMINAL PAIN: 0
COUGH: 1
SHORTNESS OF BREATH: 0
SORE THROAT: 1

## 2019-11-18 ASSESSMENT — ENCOUNTER SYMPTOMS
WHEEZING: 0
NAUSEA: 0
DIARRHEA: 0
CHEST TIGHTNESS: 0
PHOTOPHOBIA: 0
EYE PAIN: 0
TROUBLE SWALLOWING: 0

## 2020-01-10 ENCOUNTER — OFFICE VISIT (OUTPATIENT)
Dept: FAMILY MEDICINE CLINIC | Age: 30
End: 2020-01-10

## 2020-01-10 VITALS
OXYGEN SATURATION: 99 % | SYSTOLIC BLOOD PRESSURE: 120 MMHG | RESPIRATION RATE: 16 BRPM | TEMPERATURE: 98.8 F | HEART RATE: 75 BPM | BODY MASS INDEX: 23.39 KG/M2 | DIASTOLIC BLOOD PRESSURE: 68 MMHG | HEIGHT: 64 IN | WEIGHT: 137 LBS

## 2020-01-10 PROCEDURE — 99213 OFFICE O/P EST LOW 20 MIN: CPT | Performed by: PHYSICIAN ASSISTANT

## 2020-01-10 PROCEDURE — 90688 IIV4 VACCINE SPLT 0.5 ML IM: CPT | Performed by: PHYSICIAN ASSISTANT

## 2020-01-10 PROCEDURE — 90471 IMMUNIZATION ADMIN: CPT | Performed by: PHYSICIAN ASSISTANT

## 2020-01-10 ASSESSMENT — ENCOUNTER SYMPTOMS
WHEEZING: 0
BACK PAIN: 0
VOMITING: 0
CONSTIPATION: 0
SORE THROAT: 0
NAUSEA: 0
DIARRHEA: 0
COUGH: 0
SHORTNESS OF BREATH: 0

## 2020-01-10 ASSESSMENT — PATIENT HEALTH QUESTIONNAIRE - PHQ9
1. LITTLE INTEREST OR PLEASURE IN DOING THINGS: 0
SUM OF ALL RESPONSES TO PHQ QUESTIONS 1-9: 0
SUM OF ALL RESPONSES TO PHQ QUESTIONS 1-9: 0
SUM OF ALL RESPONSES TO PHQ9 QUESTIONS 1 & 2: 0
2. FEELING DOWN, DEPRESSED OR HOPELESS: 0

## 2020-01-10 NOTE — PROGRESS NOTES
Subjective  Kalina Ethan, 34 y.o. female presents today with:  Chief Complaint   Patient presents with    Employment Physical     Patient  here for a physical for her new job at a . Patient should be starting next week. 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 HENT: Negative for congestion, ear pain and sore throat. Respiratory: Negative for cough, shortness of breath and wheezing. Gastrointestinal: Negative for constipation, diarrhea, nausea and vomiting. Genitourinary: Positive for pelvic pain. Last menstrual period 1 week ago  Has occasional pelvic pain history of recurrent ovarian cyst   Musculoskeletal: Negative for back pain and neck pain. Skin: Negative for rash. Allergic/Immunologic: Negative for environmental allergies and food allergies. Neurological: Positive for headaches. Negative for dizziness. Psychiatric/Behavioral: Negative for sleep disturbance.          Past Medical History:   Diagnosis Date    Heart murmur     childhood    Hernia of abdominal wall     Umbilical hernia 7/8476     Past Surgical History:   Procedure Laterality Date    HERNIA REPAIR      [place] ESOPHAGOGASTRODUODENOSCOPY TRANSORAL DIAGNOSTIC N/A 8/31/2018    EGD ESOPHAGOGASTRODUODENOSCOPY performed by Kee Fair MD at [address] [facility] [address] N/A 12/2/2016    4.3 cm Ventralex mesh     Social History     Socioeconomic History    Marital status:      Spouse name: Not on file    Number of children: Not on file    Years of education: Not on file    Highest education level: Not on file   Occupational History    Not on file   Social Needs    Financial resource strain: Not on file    Food insecurity:     Worry: Not on file     Inability: Not on file    Transportation needs:     Medical: Not on file     Non-medical: Not on file   Tobacco Use    Smoking status: Never Smoker    Smokeless tobacco: Never Used   Substance and Sexual Activity    Alcohol use: No    Drug use: No    Sexual activity: Yes     Partners: Male   Lifestyle    Physical activity:     Days per week: Not on file     Minutes per session: Not on file    Stress: Not on file   Relationships    Social connections:     Talks on phone: Not on file     Gets together: Not on file     Attends Quaker service: Not on file     Active member of club or organization: Not on file     Attends meetings of clubs or organizations: Not on file     Relationship status: Not on file    Intimate partner violence:     Fear of current or ex partner: Not on file     Emotionally abused: Not on file     Physically abused: Not on file     Forced sexual activity: Not on file   Other Topics Concern    Not on file   Social History Narrative    Not on file     Family History   Problem Relation Age of Onset    Other Mother         fibromylagia    Other Father     No Known Problems Sister     No Known Problems Son     No Known Problems Daughter     No Known Problems Son     No Known Problems Daughter       No Known Allergies  Current Outpatient Medications   Medication Sig Dispense Refill    ibuprofen (ADVIL;MOTRIN) 800 MG tablet Take 1 tablet by mouth every 6 hours as needed for Pain 40 tablet 1     No current facility-administered medications for this visit.         Objective    Vitals:    01/10/20 0951   BP: 120/68   Site: Right Upper Arm   Position: Sitting   Cuff Size: Medium Adult   Pulse: 75   Resp: 16   Temp: 98.8 °F (37.1 °C)   TempSrc: Oral   SpO2: 99%   Weight: 137 lb (62.1 kg)   Height: 5' 3.5\" (1.613 m) Physical Exam  Constitutional:       General: She is not in acute distress. Appearance: She is obese. She is not ill-appearing. HENT:      Head: Normocephalic and atraumatic. Right Ear: External ear normal.      Left Ear: External ear normal.      Nose: Nose normal.      Mouth/Throat:      Mouth: Mucous membranes are dry. Pharynx: Oropharynx is clear. Eyes:      Extraocular Movements: Extraocular movements intact. Conjunctiva/sclera: Conjunctivae normal.      Pupils: Pupils are equal, round, and reactive to light. Neck:      Musculoskeletal: Normal range of motion and neck supple. Thyroid: No thyromegaly. Cardiovascular:      Rate and Rhythm: Normal rate and regular rhythm. Heart sounds: Normal heart sounds. No murmur. Pulmonary:      Effort: Pulmonary effort is normal. No respiratory distress. Breath sounds: Normal breath sounds. No wheezing or rhonchi. Abdominal:      General: Bowel sounds are normal.      Palpations: Abdomen is soft. There is no mass. Tenderness: There is no tenderness. There is no guarding. Musculoskeletal: Normal range of motion. Lymphadenopathy:      Cervical: No cervical adenopathy. Skin:     General: Skin is warm and dry. Coloration: Skin is not jaundiced. Neurological:      General: No focal deficit present. Mental Status: She is alert and oriented to person, place, and time. Cranial Nerves: No cranial nerve deficit. Motor: No weakness. Coordination: Coordination normal.      Gait: Gait normal.   Psychiatric:         Mood and Affect: Mood normal.         Behavior: Behavior normal.         Thought Content: Thought content normal.         Judgment: Judgment normal.              Assessment & Plan    Diagnosis Orders   1. Examination, physical, employee     2. Need for influenza vaccination  INFLUENZA, QUADV, 3 YRS AND OLDER, IM, MDV, 0.5ML (Vic Sandoval)   3.  Immunity status testing  Rubeola Antibody IgG Mumps Antibody, IgG    Rubella Antibody, IgG         Orders Placed This Encounter   Procedures    INFLUENZA, QUADV, 3 YRS AND OLDER, IM, MDV, 0.5ML (AFLURIA QUADV)    Rubeola Antibody IgG     Standing Status:   Future     Standing Expiration Date:   1/10/2021    Mumps Antibody, IgG     Standing Status:   Future     Standing Expiration Date:   1/10/2021    Rubella Antibody, IgG     Standing Status:   Future     Standing Expiration Date:   1/10/2021     No orders of the defined types were placed in this encounter. Medications Discontinued During This Encounter   Medication Reason    Cholecalciferol (VITAMIN D3) 5000 units CAPS Patient Choice    dicyclomine (BENTYL) 20 MG tablet Patient Choice    iron polysaccharide complex-B12-folic acid (FERREX-FORTE) 150-0.025-1 MG CAPS capsule Patient Choice    omeprazole (PRILOSEC) 20 MG delayed release capsule Patient Choice    pantoprazole (PROTONIX) 20 MG tablet Patient Choice    ondansetron (ZOFRAN-ODT) 8 MG TBDP disintegrating tablet Patient Choice    ibuprofen (ADVIL;MOTRIN) 354 MG tablet DUPLICATE     Return if symptoms worsen or fail to improve.     Urszula Holland PA-C

## 2020-01-23 ENCOUNTER — TELEPHONE (OUTPATIENT)
Dept: FAMILY MEDICINE CLINIC | Age: 30
End: 2020-01-23

## 2020-01-23 NOTE — TELEPHONE ENCOUNTER
Caled patient to et her know we still have her work paperwork here and she needs to get her labs done for titers so we can finish and send off her forms lmom

## 2020-06-12 ENCOUNTER — VIRTUAL VISIT (OUTPATIENT)
Dept: FAMILY MEDICINE CLINIC | Age: 30
End: 2020-06-12

## 2020-06-12 ENCOUNTER — NURSE TRIAGE (OUTPATIENT)
Dept: OTHER | Facility: CLINIC | Age: 30
End: 2020-06-12

## 2020-06-12 ENCOUNTER — TELEPHONE (OUTPATIENT)
Dept: ADMINISTRATIVE | Age: 30
End: 2020-06-12

## 2020-06-12 PROCEDURE — 99213 OFFICE O/P EST LOW 20 MIN: CPT | Performed by: PHYSICIAN ASSISTANT

## 2020-06-12 NOTE — TELEPHONE ENCOUNTER
Reason for Disposition   Unable to use arm at all and because of shoulder pain or stiffness    Answer Assessment - Initial Assessment Questions  1. ONSET: \"When did the pain start?\"      3.5 months ago   2. LOCATION: \"Where is the pain located? \"      Right shoulder  3. PAIN: \"How bad is the pain? \" (Scale 1-10; or mild, moderate, severe)    - MILD (1-3): doesn't interfere with normal activities    - MODERATE (4-7): interferes with normal activities (e.g., work or school) or awakens from sleep    - SEVERE (8-10): excruciating pain, unable to do any normal activities, unable to move arm at all due to pain      Always at a 5/10 , but if move then 10/10 . Also , at night the pain is worse. 4. WORK OR EXERCISE: \"Has there been any recent work or exercise that involved this part of the body? \"      no  5. CAUSE: \"What do you think is causing the shoulder pain? \"      She injured it 2 years ago , when a door fell on it - so maybe related to this ? 6. OTHER SYMPTOMS: \"Do you have any other symptoms? \" (e.g., neck pain, swelling, rash, fever, numbness, weakness)      Denies. Been doing stretches and ice and motrin but it is not helping. Shoulder keeps \"locking. \"   7. PREGNANCY: \"Is there any chance you are pregnant? \" \"When was your last menstrual period? \"      no    Protocols used: SHOULDER PAIN-ADULT-[place]    This call came through via pod 2 , however she was initially calling her doctor's office (pod 1) and she is returning a VM from us. Caller reports symptoms as documented above. Caller informed of disposition. Soft transfer to pre-service center to schedule appointment as recommended. Care advice as documented. Please do not respond to the triage nurse through this encounter. Any subsequent communication should be directly with the patient.

## 2020-07-08 ENCOUNTER — TELEPHONE (OUTPATIENT)
Dept: FAMILY MEDICINE CLINIC | Age: 30
End: 2020-07-08

## 2020-07-08 NOTE — TELEPHONE ENCOUNTER
[facility] scheduling called regarding the MRI right shoulder. It has to be with contrast if you want the arthrogram.    Tali Bocanegra would like someone to call her back and let her know.   [phone]

## 2020-07-27 ENCOUNTER — HOSPITAL ENCOUNTER (OUTPATIENT)
Dept: MRI IMAGING | Age: 30
Discharge: HOME | End: 2020-07-29

## 2020-07-27 ENCOUNTER — HOSPITAL ENCOUNTER (OUTPATIENT)
Dept: GENERAL RADIOLOGY | Age: 30
Discharge: HOME | End: 2020-07-29

## 2020-07-27 PROCEDURE — 2500000003 HC RX 250 WO HCPCS: Performed by: PHYSICIAN ASSISTANT

## 2020-07-27 PROCEDURE — A9579 GAD-BASE MR CONTRAST NOS,1ML: HCPCS | Performed by: PHYSICIAN ASSISTANT

## 2020-07-27 PROCEDURE — 6360000004 HC RX CONTRAST MEDICATION: Performed by: PHYSICIAN ASSISTANT

## 2020-07-27 PROCEDURE — 73222 MRI JOINT UPR EXTREM W/DYE: CPT

## 2020-07-27 PROCEDURE — 73040 CONTRAST X-RAY OF SHOULDER: CPT

## 2020-07-27 RX ORDER — LIDOCAINE HYDROCHLORIDE 20 MG/ML
10 INJECTION, SOLUTION INFILTRATION; PERINEURAL ONCE
Status: COMPLETED | OUTPATIENT
Start: 2020-07-27 | End: 2020-07-27

## 2020-07-27 RX ADMIN — GADOTERIDOL 0.1 ML: 279.3 INJECTION, SOLUTION INTRAVENOUS at 13:59

## 2020-07-27 RX ADMIN — IOVERSOL 12 ML: 678 INJECTION INTRA-ARTERIAL; INTRAVENOUS at 13:58

## 2020-07-27 RX ADMIN — LIDOCAINE HYDROCHLORIDE 9 ML: 20 INJECTION, SOLUTION INFILTRATION; PERINEURAL at 13:57

## 2020-07-27 ASSESSMENT — PAIN SCALES - GENERAL: PAINLEVEL_OUTOF10: 0

## 2020-08-28 ENCOUNTER — TELEPHONE (OUTPATIENT)
Dept: FAMILY MEDICINE CLINIC | Age: 30
End: 2020-08-28

## 2020-09-18 ENCOUNTER — OFFICE VISIT (OUTPATIENT)
Dept: ORTHOPEDIC SURGERY | Age: 30
End: 2020-09-18

## 2020-09-18 VITALS
WEIGHT: 141 LBS | OXYGEN SATURATION: 99 % | TEMPERATURE: 97 F | BODY MASS INDEX: 24.07 KG/M2 | RESPIRATION RATE: 16 BRPM | HEIGHT: 64 IN | HEART RATE: 61 BPM

## 2020-09-18 PROBLEM — M75.81 ROTATOR CUFF TENDONITIS, RIGHT: Status: ACTIVE | Noted: 2020-09-18

## 2020-09-18 PROBLEM — S43.431A SUPERIOR LABRUM ANTERIOR-TO-POSTERIOR (SLAP) TEAR OF RIGHT SHOULDER: Status: ACTIVE | Noted: 2020-09-18

## 2020-09-18 PROCEDURE — 99203 OFFICE O/P NEW LOW 30 MIN: CPT | Performed by: ORTHOPAEDIC SURGERY

## 2020-09-18 RX ORDER — CELECOXIB 200 MG/1
200 CAPSULE ORAL DAILY
Qty: 30 CAPSULE | Refills: 1 | Status: SHIPPED | OUTPATIENT
Start: 2020-09-18 | End: 2021-11-20

## 2020-09-18 ASSESSMENT — ENCOUNTER SYMPTOMS: BACK PAIN: 0

## 2020-09-18 NOTE — PROGRESS NOTES
Subjective:      Patient ID: Natalia Taylor is a 27 y.o. female who presents today for:  Chief Complaint   Patient presents with    Shoulder Pain     NP-right shoulder pain, since Feb 2020, no recent injury, but she had a door fall on her shoulder 3-4 years ago and she had no pain, but recently she has had loss of movement and extreme pain, pt has iced it and tried naproxen for relief but has stomach issues and naproxen causes her stomach issues to flare up, pt is left hand dominant; MRI and xray completed 7/27/20; rates her pain today 7/10 and it is at a constant 7 unless she moves it then it jumps up       HPI  D  with right shoulder pain for 7 months. No injury associated with current pain. Remote injury unrelated. Rates pain as 7/10 at rest. With motion shooting pain down the arm into upper arm, but not below the elbow. Also, stiff. Naproxen irritates her stomach. Ibuprofen and tylenol have also been used. No Physical therapy. MRI and xrays completed.     Past Medical History:   Diagnosis Date    Heart murmur     childhood    Hernia of abdominal wall     Umbilical hernia 0/1715      Past Surgical History:   Procedure Laterality Date    HERNIA REPAIR      [place] ESOPHAGOGASTRODUODENOSCOPY TRANSORAL DIAGNOSTIC N/A 8/31/2018    EGD ESOPHAGOGASTRODUODENOSCOPY performed by Carloz Blevins MD at [address] [facility] [address] N/A 12/2/2016    4.3 cm Ventralex mesh     Social History     Socioeconomic History    Marital status:      Spouse name: Not on file    Number of children: Not on file    Years of education: Not on file    Highest education level: Not on file   Occupational History    Not on file   Social Needs    Financial resource strain: Not on file    Food insecurity     Worry: Not on file     Inability: Not on file    Transportation needs     Medical: Not on file     Non-medical: Not on file   Tobacco Use    Smoking status: Never Smoker    Smokeless tobacco: Never Used   Substance and Sexual Activity    Alcohol use: No    Drug use: No    Sexual activity: Yes     Partners: Male   Lifestyle    Physical activity     Days per week: Not on file     Minutes per session: Not on file    Stress: Not on file   Relationships    Social connections     Talks on phone: Not on file     Gets together: Not on file     Attends Hinduism service: Not on file     Active member of club or organization: Not on file     Attends meetings of clubs or organizations: Not on file     Relationship status: Not on file    Intimate partner violence     Fear of current or ex partner: Not on file     Emotionally abused: Not on file     Physically abused: Not on file     Forced sexual activity: Not on file   Other Topics Concern    Not on file   Social History Narrative    Not on file     Family History   Problem Relation Age of Onset    Other Mother         fibromylagia    Other Father     No Known Problems Sister     No Known Problems Son     No Known Problems Daughter     No Known Problems Son     No Known Problems Daughter      No Known Allergies  Current Outpatient Medications on File Prior to Visit   Medication Sig Dispense Refill    ibuprofen (ADVIL;MOTRIN) 800 MG tablet Take 1 tablet by mouth every 6 hours as needed for Pain 40 tablet 1     No current facility-administered medications on file prior to visit. Review of Systems   Constitutional: Negative for fever. Cardiovascular: Negative for leg swelling. Musculoskeletal: Negative for arthralgias, back pain, gait problem, joint swelling and neck pain. Skin: Negative for rash. Neurological: Negative for weakness and numbness.          Objective:   Pulse 61   Temp 97 °F (36.1 °C) (Temporal)   Resp 16   Ht 5' 3.5\" (1.613 m)   Wt 141 lb (64 kg)   SpO2 99%   BMI 24.59 kg/m²     Right Shoulder Exam     Range of Motion   Active abduction: 90   Passive abduction: 90   Extension: 60   External rotation: 70   Forward flexion: 180   Internal rotation 0 degrees: T12   Internal rotation 90 degrees: 80     Muscle Strength   Abduction: 5/5   Internal rotation: 5/5   External rotation: 5/5   Supraspinatus: 5/5   Subscapularis: 5/5   Biceps: 5/5     Tests   Apprehension: negative  Barahona test: negative  Cross arm: negative  Impingement: negative  Drop arm: negative  Sulcus: absent    Other   Scars: absent  Sensation: normal  Pulse: present      Left Shoulder Exam   Left shoulder exam is normal.    Tenderness   The patient is experiencing tenderness in the acromion. Range of Motion   Active abduction: 30   Passive abduction: 90   Extension: 60   External rotation: 70   Forward flexion: 180   Internal rotation 0 degrees: T12   Internal rotation 90 degrees: 80     Muscle Strength   Abduction: 5/5   Internal rotation: 5/5   External rotation: 5/5   Supraspinatus: 5/5   Subscapularis: 5/5   Biceps: 5/5     Tests   Apprehension: negative  Barahona test: positive  Cross arm: negative  Impingement: positive  Drop arm: negative  Sulcus: absent    Other   Scars: absent  Sensation: normal  Pulse: present               Radiographs and Laboratory Studies:     Diagnostic Imaging Studies:    She had a plain x-ray for the arthrogram which showed no abnormality the acromioclavicular joint. There was filling of the glenohumeral joint with the dye. I reviewed her MRI images. This was consistent with a superior labral tear. In addition there was evidence of supraspinatus tendinitis tendinosis. The radiologist had discussed partial tearing of the inferior glenohumeral ligament. Laboratory Studies:   Lab Results   Component Value Date    WBC 4.1 (L) 03/13/2017    HGB 11.3 (L) 03/13/2017    HCT 34.9 (L) 03/13/2017    MCV 83.2 03/13/2017     03/13/2017     No results found for: SEDRATE  No results found for: CRP    Assessment:      Diagnosis Orders   1.  Superior labrum anterior-to-posterior (SLAP) tear of right shoulder  Ambulatory referral to Physical Therapy    celecoxib (CELEBREX) 200 MG capsule   2. Rotator cuff tendonitis, right  Ambulatory referral to Physical Therapy    celecoxib (CELEBREX) 200 MG capsule          Plan:   We discussed the MRI findings. We discussed the natural history of this. She does not tolerate the naproxen and ibuprofen because it irritates her stomach. Therefore we discussed starting her on celecoxib. She was advised on risks benefits appropriate dosing. Prescription was sent to her pharmacy. In addition we discussed starting formal physical therapy. She was given a prescription for this. I will see her back in 3 months. In 3 months if she has persistent symptoms we discussed the possibility of corticosteroid injection. We discussed that arthroscopic shoulder surgery would be option if she failed nonoperative treatment. Orders Placed This Encounter   Procedures    Ambulatory referral to Physical Therapy     Referral Priority:   Routine     Referral Type:   Eval and Treat     Referral Reason:   Specialty Services Required     Requested Specialty:   Physical Therapy     Number of Visits Requested:   1     Orders Placed This Encounter   Medications    celecoxib (CELEBREX) 200 MG capsule     Sig: Take 1 capsule by mouth daily     Dispense:  30 capsule     Refill:  1       Return in about 3 months (around 12/18/2020) for reevaluation of shoulder pain and motion.       Yesi Urbina MD

## 2020-10-09 ENCOUNTER — HOSPITAL ENCOUNTER (OUTPATIENT)
Dept: PHYSICAL THERAPY | Age: 30
Setting detail: THERAPIES SERIES
Discharge: HOME | End: 2020-10-09

## 2020-10-09 PROCEDURE — G0283 ELEC STIM OTHER THAN WOUND: HCPCS

## 2020-10-09 PROCEDURE — 97110 THERAPEUTIC EXERCISES: CPT

## 2020-10-09 PROCEDURE — 97162 PT EVAL MOD COMPLEX 30 MIN: CPT

## 2020-10-09 ASSESSMENT — PAIN DESCRIPTION - LOCATION: LOCATION: SHOULDER

## 2020-10-09 ASSESSMENT — PAIN DESCRIPTION - PAIN TYPE: TYPE: CHRONIC PAIN

## 2020-10-09 ASSESSMENT — PAIN DESCRIPTION - DESCRIPTORS: DESCRIPTORS: CONSTANT;ACHING

## 2020-10-09 ASSESSMENT — PAIN DESCRIPTION - ORIENTATION: ORIENTATION: RIGHT

## 2020-10-09 ASSESSMENT — PAIN SCALES - GENERAL: PAINLEVEL_OUTOF10: 5

## 2020-10-09 ASSESSMENT — PAIN - FUNCTIONAL ASSESSMENT: PAIN_FUNCTIONAL_ASSESSMENT: PREVENTS OR INTERFERES WITH ALL ACTIVE AND SOME PASSIVE ACTIVITIES

## 2020-10-09 ASSESSMENT — PAIN DESCRIPTION - PROGRESSION: CLINICAL_PROGRESSION: GRADUALLY WORSENING

## 2020-10-09 ASSESSMENT — PAIN DESCRIPTION - ONSET: ONSET: SUDDEN

## 2020-10-09 ASSESSMENT — PAIN DESCRIPTION - FREQUENCY: FREQUENCY: CONTINUOUS

## 2020-10-09 NOTE — PROGRESS NOTES
[address]  PHYSICAL THERAPY EVALUATION    Date: 10/9/2020  Patient Name: Maria Reyez       MRN: [id number]   Account: [de-identified]   : 1990  (27 y.o.)   Gender: female   Referring Practitioner: Gregg George                 Diagnosis: Superior labrum anterior-to-posterior (SLAP) tear of right shoulder, Rotator cuff tendonitis, right  Treatment Diagnosis: decreased R shoulder A/PROM, decreased R UE strength, increased R shoulder pain, and decreased fucntional activity tolerance     Past Medical History:  has a past medical history of Heart murmur, Hernia of abdominal wall, and Umbilical hernia. Past Surgical History:   has a past surgical history that includes Tubal ligation; Umbilical hernia repair (N/A, 2016); hernia repair; and pr esophagogastroduodenoscopy transoral diagnostic (N/A, 2018). Vital Signs  Patient Currently in Pain: Yes   Pain Screening  Patient Currently in Pain: Yes  Pain Assessment  Pain Assessment: 0-10  Pain Level: 5(Pain ranges from 5-10/10)  Pain Type: Chronic pain  Pain Location: Shoulder  Pain Orientation: Right  Pain Descriptors: Constant; Aching  Pain Frequency: Continuous  Pain Onset: Sudden  Clinical Progression: Gradually worsening  Functional Pain Assessment: Prevents or interferes with all active and some passive activities  Non-Pharmaceutical Pain Intervention(s): Cold applied; Heat applied     Lives With: Spouse; Family  ADL Assistance: Independent  Homemaking Assistance: Independent  Homemaking Responsibilities: Yes  Ambulation Assistance: Independent  Transfer Assistance: Independent  Active : Yes  Occupation: Full time employment  Type of occupation:   IADL Comments: Current functional level 50%  Additional Comments: L hand dominant     Subjective:  Subjective: Pt reports in 2020 she woke up with R shoulder pain which became progressively stiff and worse over time.  Pt reports she didnt see MD originally d/t COVID-19 pandemic. Attempted to control pain and stiffness with exercise and meds however contd to persist. Recently saw MD who gave her anti-inflammatories which she is currently taking as needed. Pt reports meds helped with the stiffness though pain conts to be present. Had MRI which showed partial thickness tear of Inf GH ligament, SLAP tear, and mild supraspinatus tendonosis. Comments: RTD 3 months    Objective:   Strength RUE  Comment: elbow 4/5 shoulder grossly 2+/5  Strength LUE  Comment: 5/5 shoulder and elbow     PROM RUE (degrees)  RUE General PROM: shoulder flex 126, abd 60, ER/IR WNL's @ 40 deg abd(pain limiting flex and abd)     AROM RUE (degrees)  RUE General AROM: shoulder flex 55, abd 30, ER UT unable to reach midline, IR PSIS(pain at all end ranges)  AROM LUE (degrees)  LUE General AROM: shouler flex 170, abd 168, ER T6, IR T9    Observation/Palpation  Posture: Good  Palpation: (-) TTP R shoulder complex aside from UT; mod tightness R UT, pec, periscapulars  Observation: mild rounded shoudlers    Additional Measures  Special Tests: NA d/t recent MRI and increased pain/mm guarding     Exercises:   Exercises  Exercise 1: table slides flex/scap 5\"x5  Exercise 2: scap retract 5\"x10  Exercise 3: pulleys focus on PROM*  Exercise 4: shoulder ruthy*  Exercise 5: finger ladder as dante*  Exercise 20: HEP: table slides, scap retract  Modalities:  Modalities  Moist heat: x10 min with IFC to R shoulder to decrease pain and mm tightness post exertion  Ultrasound: PRN R shoulder*  E-stim (parameters): IFC x10 min to R shoulder with MHP to decrease pain post exertion  Manual:  Manual therapy  PROM: R shoudler*  Soft Tissue Mobalization: R UT, pec*  *Indicates exercise,modality, or manual techniques to be initiated when appropriate  Assessment:   Body structures, Functions, Activity limitations: Decreased functional mobility , Decreased ADL status, Decreased ROM, Decreased strength, Increased pain, Decreased high-level IADLs, Decreased posture  Assessment: Pt presents with chronic hx of R shoulder pain beginning in Feb 2020. Recent imaging identifies SLAP tear, inferior GH ligament partial thickness tear, and supraspinatus tendonosis. She currently presents with decreased R shoulder A/PROM, decreased R UE strength, increased R shoulder pain, and decreased fucntional activity tolerance. These impairments currently limit her fucntional abilities to reach, lift, carry, perform ADL's, household duties, work related duties, push, pull, or sleep without icnreased pain or limitations at PLOF. Specific instructions for Next Treatment: progress slowly as tolerated  Prognosis: Good, Fair  Discharge Recommendations: Continue to assess pending progress  Activity Tolerance: Patient limited by pain     Decision Making: Medium Complexity  History: med  Exam: high; UEFI 28/80  Clinical Presentation: high      Plan  Frequency/Duration:  Plan  Times per week: 1-2  Plan weeks: 6-8  Specific instructions for Next Treatment: progress slowly as tolerated  Current Treatment Recommendations: Strengthening, ROM, Home Exercise Program, Manual Therapy - Soft Tissue Mobilization, Manual Therapy - Joint Manipulation, Patient/Caregiver Education & Training, Modalities     Patient Education  New Education Provided: PT Education: Goals;PT Role;Plan of Care;Home Exercise Program    POST-PAIN     Pain Rating (0-10 pain scale):  5 /10  Location and pain description same as pre-treatment unless indicated. Action: [] NA  [] Call Physician  [x] Perform HEP  [x] Meds as prescribed    Evaluation and patient rights have been reviewed and patient agrees with plan of care.   Yes  [x]  No  []   Explain:     Naren Fall Risk Assessment  Risk Factor Scale  Score   History of Falls [] Yes  [x] No 25  0    Secondary Diagnosis [] Yes  [x] No 15  0    Ambulatory Aid [] Furniture  [] Crutches/cane/walker  [x] None/bedrest/wheelchair/nurse 30  15  0

## 2020-10-09 NOTE — PROGRESS NOTES
[address]     Ph: [phone]  Fax: [phone]    [] Certification  [] Recertification [x]  Plan of Care  [] Progress Note [] Discharge      To:   Dania Gonzalez      From:  Ron Cavanaugh, PT, DPT  Patient: Florencia Good     : 1990  Diagnosis: Superior labrum anterior-to-posterior (SLAP) tear of right shoulder, Rotator cuff tendonitis, right     Date: 10/9/2020  Treatment Diagnosis: decreased R shoulder A/PROM, decreased R UE strength, increased R shoulder pain, and decreased fucntional activity tolerance    Progress Report Period from:  10/9/2020  to 10/9/2020    Total # of Visits to Date: 1   No Show: 0    Canceled Appointment: 0     OBJECTIVE:   Short Term Goals - Time Frame for Short term goals: 3-4 weeks    Goals Current/Discharge status  Met   Short term goal 1: The pt will report decreased R shoulder pain </= 5/10 at worst to increase ease with ADL's  10/10 at worst [] yes  [x] no     Long Term Goals - Time Frame for Long term goals : 6-8 weeks  Goals Current/ Discharge status Met   Long term goal 1: The pt will have a increase in UEFI score >/=60/80 in order to increase functional activity tolerance 28/80 [] yes  [x] no   Long term goal 2: The pt will be indep/compliant with HEP in order to self manage symptoms upon D/C ongoing [] yes  [x] no   Long term goal 3: The pt will demo improved R shoulder AROM WNL's in order to increase ease with ADL's PROM RUE (degrees)  RUE General PROM: shoulder flex 126, abd 60, ER/IR WNL's @ 40 deg abd(pain limiting flex and abd)  AROM RUE (degrees)  RUE General AROM: shoulder flex 55, abd 30, ER UT unable to reach midline, IR PSIS(pain at all end ranges) [] yes  [x] no   Long term goal 4: The pt will demonstrate improved R UE strength >/=4+/5 in order to lift/carry with decreased pain Strength RUE  Comment: elbow 4/5 shoulder grossly 2+/5 [] yes  [x] no Signature:__________________________________________________________  Date:  Please sign and return

## 2020-10-13 ENCOUNTER — HOSPITAL ENCOUNTER (OUTPATIENT)
Dept: PHYSICAL THERAPY | Age: 30
Setting detail: THERAPIES SERIES
Discharge: HOME | End: 2020-10-13

## 2020-10-13 PROCEDURE — G0283 ELEC STIM OTHER THAN WOUND: HCPCS

## 2020-10-13 PROCEDURE — 97110 THERAPEUTIC EXERCISES: CPT

## 2020-10-13 PROCEDURE — 97140 MANUAL THERAPY 1/> REGIONS: CPT

## 2020-10-13 ASSESSMENT — PAIN DESCRIPTION - LOCATION: LOCATION: SHOULDER

## 2020-10-13 ASSESSMENT — PAIN DESCRIPTION - PAIN TYPE: TYPE: CHRONIC PAIN

## 2020-10-13 ASSESSMENT — PAIN DESCRIPTION - ORIENTATION: ORIENTATION: RIGHT

## 2020-10-13 ASSESSMENT — PAIN SCALES - GENERAL: PAINLEVEL_OUTOF10: 7

## 2020-10-13 ASSESSMENT — PAIN DESCRIPTION - DESCRIPTORS: DESCRIPTORS: TIGHTNESS

## 2020-10-13 NOTE — PROGRESS NOTES
[id number] [address]  Outpatient Physical Therapy    Treatment Note        Date: 10/13/2020  Patient: Nancie Washington  : 1990  ACCT #: [de-identified]  Referring Practitioner: Mahendra Chatterjee  Diagnosis: Superior labrum anterior-to-posterior (SLAP) tear of right shoulder, Rotator cuff tendonitis, right    Visit Information:  PT Visit Information  Onset Date: 20  PT Insurance Information: Self Pay  Total # of Visits to Date: 2  No Show: 0  Canceled Appointment: 0  Progress Note Counter: -    Subjective: Pt denies pain at rest currently, has been compliant with HEP with some improvements in ROM and improved ability to reach and \"hug . \" Pt also reports has not been taking anti-inflmmatories d/t stomach issues. Comments: RTD 3 months  HEP Compliance:  [x] Good [] Fair [] Poor [] Reports not doing due to:    Vital Signs  Patient Currently in Pain: Yes   Pain Screening  Patient Currently in Pain: Yes  Pain Assessment  Pain Assessment: 0-10  Pain Level:  7(with movement)  Pain Type: Chronic pain  Pain Location: Shoulder  Pain Orientation: Right  Pain Descriptors: Tightness    OBJECTIVE:   Exercises  Exercise 1: table slides flex/scap/ER 5\"x10  Exercise 2: scap retract 5\"x10  Exercise 3: pulleys focus on PROM x4 mins  Exercise 4: shoulder ruthy*  Exercise 5: finger ladder flexion to L 15 3\"x3 with use of L UE to assist for concentric and eccentric  Exercise 6: supine AAROM shoulder flexion with use of L UE x5- increased pain with wand  Exercise 7: supine wand ER AAROM 5\"x10  Exercise 20: HEP: table ER stretch, AAROM shoulder flex    Strength: [x] NT  [] MMT completed:    ROM: [x] NT  [] ROM measurements:     Manual:   Manual therapy  PROM: R shoudler flex, abd, ER @ 45 deg abd  Soft Tissue Mobalization: R pec; total manual x14 mins    Modalities:  Modalities  Moist heat: x10 min with IFC to R shoulder to decrease pain and mm tightness post exertion  Ultrasound: PRN R shoulder*  E-stim (parameters): IFC x10 min to R shoulder with MHP to decrease pain post exertion     *Indicates exercise, modality, or manual techniques to be initiated when appropriate    Assessment: Body structures, Functions, Activity limitations: Decreased functional mobility , Decreased ADL status, Decreased ROM, Decreased strength, Increased pain, Decreased high-level IADLs, Decreased posture  Assessment: Pt with improving tolerance and motion with passive and AAROM ex this date. Pt requires VC's to improve relaxation and decrease mm guarding with PROM with good carryover. Conclude with MHP/estim for further pain control post exertion. Treatment Diagnosis: decreased R shoulder A/PROM, decreased R UE strength, increased R shoulder pain, and decreased fucntional activity tolerance  Prognosis: Good, Fair     Goals:  Short term goals  Time Frame for Short term goals: 3-4 weeks  Short term goal 1: The pt will report decreased R shoulder pain </= 5/10 at worst to increase ease with ADL's    Long term goals  Time Frame for Long term goals : 6-8 weeks  Long term goal 1: The pt will have a increase in UEFI score >/=60/80 in order to increase functional activity tolerance  Long term goal 2: The pt will be indep/compliant with HEP in order to self manage symptoms upon D/C  Long term goal 3: The pt will demo improved R shoulder AROM WNL's in order to increase ease with ADL's  Long term goal 4: The pt will demonstrate improved R UE strength >/=4+/5 in order to lift/carry with decreased pain  Progress toward goals: good     POST-PAIN       Pain Rating (0-10 pain scale):   5/10   Location and pain description same as pre-treatment unless indicated.    Action: [] NA   [x] Perform HEP  [x] Meds as prescribed  [x] Modalities as prescribed   [] Call Physician     Frequency/Duration:  Plan  Times per week: 1-2  Plan weeks: 6-8  Specific instructions for Next Treatment: progress slowly as tolerated  Current Treatment Recommendations: Strengthening, ROM, Home Exercise Program, Manual Therapy - Soft Tissue Mobilization, Manual Therapy - Joint Manipulation, Patient/Caregiver Education & Training, Modalities     Pt to continue current HEP. See objective section for any therapeutic exercise changes, additions or modifications this date.     PT Individual Minutes  Time In: 6882  Time Out: 0844  Minutes: 50  Timed Code Treatment Minutes: 38 Minutes  Procedure Minutes: 10 MHP/Estim      Timed Activity Minutes Units   Ther Ex 24 2   Manual  14 1       Signature:  Electronically signed by Patrick Carvajal PT on 10/13/20 at 5:13 PM EDT

## 2020-10-20 ENCOUNTER — HOSPITAL ENCOUNTER (OUTPATIENT)
Dept: PHYSICAL THERAPY | Age: 30
Setting detail: THERAPIES SERIES
Discharge: HOME | End: 2020-10-20

## 2020-10-20 PROCEDURE — G0283 ELEC STIM OTHER THAN WOUND: HCPCS

## 2020-10-20 PROCEDURE — 97140 MANUAL THERAPY 1/> REGIONS: CPT

## 2020-10-20 PROCEDURE — 97110 THERAPEUTIC EXERCISES: CPT

## 2020-10-20 ASSESSMENT — PAIN DESCRIPTION - ORIENTATION: ORIENTATION: RIGHT

## 2020-10-20 ASSESSMENT — PAIN DESCRIPTION - LOCATION: LOCATION: SHOULDER

## 2020-10-20 ASSESSMENT — PAIN SCALES - GENERAL: PAINLEVEL_OUTOF10: 3

## 2020-10-20 ASSESSMENT — PAIN DESCRIPTION - DESCRIPTORS: DESCRIPTORS: SORE

## 2020-10-20 NOTE — PROGRESS NOTES
[id number] [address]  Outpatient Physical Therapy    Treatment Note        Date: 10/20/2020  Patient: Matthew Jain  : 1990  ACCT #: [de-identified]  Referring Practitioner: Annelle Duverney  Diagnosis: Superior labrum anterior-to-posterior (SLAP) tear of right shoulder, Rotator cuff tendonitis, right    Visit Information:  PT Visit Information  Onset Date: 20  PT Insurance Information: Self Pay  Total # of Visits to Date: 3  No Show: 0  Canceled Appointment: 0  Progress Note Counter: 3/12-    Subjective: Pt reports her pain level has been better than it was before starting PT. Has not reached 9-10/10. highest pain level is 7/10 without use of anti-inflammatories. Has had recent fair compliance with HEP d/t busy with work schedule.   Comments: RTD 3 months  HEP Compliance:  [] Good [x] Fair [] Poor [] Reports not doing due to:    Vital Signs  Patient Currently in Pain: Yes   Pain Screening  Patient Currently in Pain: Yes  Pain Assessment  Pain Assessment: 0-10  Pain Level: 3  Pain Location: Shoulder  Pain Orientation: Right  Pain Descriptors: Sore    OBJECTIVE:   Exercises  Exercise 1: table slides flex/scap/ER 5\"x15  Exercise 4: shoulder ruthy 5\"x5-8 as tolerated  Exercise 5: finger ladder flexion to L 18 3\"x3 with use of L UE to assist for concentric and eccentric  Exercise 6: seated flex AAROM with use of opposite UE x10  Exercise 8: standing wand IR, ER, Ext, scaption 5\"x10  Exercise 20: HEP: shoulder ruthy    Strength: [x] NT  [] MMT completed:    ROM: [] NT  [x] ROM measurements:  AROM RUE (degrees)  RUE General AROM: shoulder flex 70, abd 100, ER T5 unable to reach midline, IR T12(pain at end range flex and abd)     Manual:   Manual therapy  Joint mobilization: grade II-III GH post/inf jt mobs  PROM: R shoudler flex, abd, ER @ 45 deg abd x8 mins    Modalities:  Modalities  Moist heat: x10 min with IFC to R shoulder to decrease pain and mm tightness post exertion  Ultrasound: PRN R shoulder*  E-stim (parameters): IFC x10 min to R shoulder with MHP to decrease pain post exertion     *Indicates exercise, modality, or manual techniques to be initiated when appropriate    Assessment: Body structures, Functions, Activity limitations: Decreased functional mobility , Decreased ADL status, Decreased ROM, Decreased strength, Increased pain, Decreased high-level IADLs, Decreased posture  Assessment: Pt with significant improvement in R shoulder AROM though conts to be limited with pain and weakness at end ranges primarily in flex and abd. Pt with increaseed sharp pain initially during PROM, improved after jt mobs to increase [address] arthrokinematic mvmt. Concluded with MHP/Estim for pain control post exertion. Treatment Diagnosis: decreased R shoulder A/PROM, decreased R UE strength, increased R shoulder pain, and decreased fucntional activity tolerance  Prognosis: Good, Fair     Goals:  Short term goals  Time Frame for Short term goals: 3-4 weeks  Short term goal 1: The pt will report decreased R shoulder pain </= 5/10 at worst to increase ease with ADL's    Long term goals  Time Frame for Long term goals : 6-8 weeks  Long term goal 1: The pt will have a increase in UEFI score >/=60/80 in order to increase functional activity tolerance  Long term goal 2: The pt will be indep/compliant with HEP in order to self manage symptoms upon D/C  Long term goal 3: The pt will demo improved R shoulder AROM WNL's in order to increase ease with ADL's  Long term goal 4: The pt will demonstrate improved R UE strength >/=4+/5 in order to lift/carry with decreased pain  Progress toward goals: good towards rom     POST-PAIN       Pain Rating (0-10 pain scale):  0/10   Location and pain description same as pre-treatment unless indicated.    Action: [] NA   [x] Perform HEP  [] Meds as prescribed  [x] Modalities as prescribed   [] Call Physician     Frequency/Duration:  Plan  Times per week: 1-2  Plan weeks: 6-8  Specific instructions for Next Treatment: progress slowly as tolerated  Current Treatment Recommendations: Strengthening, ROM, Home Exercise Program, Manual Therapy - Soft Tissue Mobilization, Manual Therapy - Joint Manipulation, Patient/Caregiver Education & Training, Modalities     Pt to continue current HEP. See objective section for any therapeutic exercise changes, additions or modifications this date.     PT Individual Minutes  Time In: 1700  Time Out: 1750  Minutes: 50  Timed Code Treatment Minutes: 38 Minutes  Procedure Minutes: 10     Timed Activity Minutes Units   Ther Ex 30 2   Manual  8 1       Signature:  Electronically signed by Endy Vega PT on 10/20/20 at 5:25 PM EDT

## 2020-10-27 ENCOUNTER — HOSPITAL ENCOUNTER (OUTPATIENT)
Dept: PHYSICAL THERAPY | Age: 30
Setting detail: THERAPIES SERIES
Discharge: HOME | End: 2020-10-27

## 2020-10-27 PROCEDURE — 97140 MANUAL THERAPY 1/> REGIONS: CPT

## 2020-10-27 PROCEDURE — 97110 THERAPEUTIC EXERCISES: CPT

## 2020-10-27 PROCEDURE — G0283 ELEC STIM OTHER THAN WOUND: HCPCS

## 2020-10-27 ASSESSMENT — PAIN DESCRIPTION - PAIN TYPE: TYPE: CHRONIC PAIN

## 2020-10-27 ASSESSMENT — PAIN SCALES - GENERAL: PAINLEVEL_OUTOF10: 5

## 2020-10-27 ASSESSMENT — PAIN DESCRIPTION - LOCATION: LOCATION: SHOULDER

## 2020-10-27 ASSESSMENT — PAIN DESCRIPTION - DESCRIPTORS: DESCRIPTORS: SORE

## 2020-10-27 ASSESSMENT — PAIN DESCRIPTION - ORIENTATION: ORIENTATION: RIGHT

## 2020-10-27 NOTE — PROGRESS NOTES
[id number] [address]  Outpatient Physical Therapy    Treatment Note        Date: 10/27/2020  Patient: Isabela Faustin  : 1990  ACCT #: [de-identified]  Referring Practitioner: Yesenia Richmond  Diagnosis: Superior labrum anterior-to-posterior (SLAP) tear of right shoulder, Rotator cuff tendonitis, right    Visit Information:  PT Visit Information  Onset Date: 20  PT Insurance Information: Self Pay  Total # of Visits to Date: 4  No Show: 0  Canceled Appointment: 0  Progress Note Counter: -    Subjective: Pt reports has not been taking anti-inflammatories for ~ 1week with worst pain reaching 7-8/10 however was able to perform it with HEP stretches. Compliant with HEP stretching though only attempted ruthy x1 and not all planes of mvmt.   Comments: RTD 3 months  HEP Compliance:  [] Good [x] Fair [] Poor [] Reports not doing due to:    Vital Signs  Patient Currently in Pain: Yes   Pain Screening  Patient Currently in Pain: Yes  Pain Assessment  Pain Assessment: 0-10  Pain Level: 5  Pain Type: Chronic pain  Pain Location: Shoulder  Pain Orientation: Right  Pain Descriptors: Sore    OBJECTIVE:   Exercises  Exercise 1: table slides on wedge flex/scap 5\"x10  Exercise 3: pulleys focus on PROM x4 mins  Exercise 4: shoulder ruthy except add and ER 5\"x5-8 as tolerated  Exercise 5: finger ladder flexion to L 24 3\"x5 with intermittent assist from L UE to assist  Exercise 8: standing wand IR, ER, Ext, scaption 5\"x10  Exercise 9: tband rows/lats*  Exercise 10: tband IR/ER*  Exercise 11: S/L ER and abd*  Exercise 20: HEP: standing wand ext, ER, scaption, IR    Strength: [x] NT  [] MMT completed:    ROM: [] NT  [x] ROM measurements:  AROM RUE (degrees)  RUE General AROM: shoudler flex 70     Manual:   Manual therapy  Joint mobilization: grade II-III GH post/inf jt mobs, gentle GH sitraction with oscillations  PROM: R shoudler flex, abd, IR@ 45 deg abd x8 mins- ER WNL's this date    Modalities:  Modalities  Moist heat: x10 min with IFC to R shoulder to decrease pain and mm tightness post exertion  Ultrasound: PRN R shoulder*  E-stim (parameters): IFC x10 min to R shoulder with MHP to decrease pain post exertion     *Indicates exercise, modality, or manual techniques to be initiated when appropriate    Assessment: Body structures, Functions, Activity limitations: Decreased functional mobility , Decreased ADL status, Decreased ROM, Decreased strength, Increased pain, Decreased high-level IADLs, Decreased posture  Assessment: Pt demos no significant change in shoulder flex AROM since last visit though was able to tolerate progression of table slides on wedge vs antigravity for improved mobility. Pt with intermittent c/o \"catching\" pains performing flex and scaption movements, good understanding of not pushing through pain for ex. Pt also with \"catching\" during PROM improved slightly with GH distraction and joint mobs.   Treatment Diagnosis: decreased R shoulder A/PROM, decreased R UE strength, increased R shoulder pain, and decreased fucntional activity tolerance  Prognosis: Good, Fair     Goals:  Short term goals  Time Frame for Short term goals: 3-4 weeks  Short term goal 1: The pt will report decreased R shoulder pain </= 5/10 at worst to increase ease with ADL's    Long term goals  Time Frame for Long term goals : 6-8 weeks  Long term goal 1: The pt will have a increase in UEFI score >/=60/80 in order to increase functional activity tolerance  Long term goal 2: The pt will be indep/compliant with HEP in order to self manage symptoms upon D/C  Long term goal 3: The pt will demo improved R shoulder AROM WNL's in order to increase ease with ADL's  Long term goal 4: The pt will demonstrate improved R UE strength >/=4+/5 in order to lift/carry with decreased pain  Progress toward goals: fair d/t contd pain     POST-PAIN       Pain Rating (0-10 pain scale):  0 /10 at rest  Location and pain description same as pre-treatment unless indicated. Action: [] NA   [x] Perform HEP  [] Meds as prescribed  [] Modalities as prescribed   [] Call Physician     Frequency/Duration:  Plan  Times per week: 1-2  Plan weeks: 6-8  Current Treatment Recommendations: Strengthening, ROM, Home Exercise Program, Manual Therapy - Soft Tissue Mobilization, Manual Therapy - Joint Manipulation, Patient/Caregiver Education & Training, Modalities     Pt to continue current HEP. See objective section for any therapeutic exercise changes, additions or modifications this date.     PT Individual Minutes  Time In: 1700  Time Out: 5775  Minutes: 53  Timed Code Treatment Minutes: 40 Minutes  Procedure Minutes: 10 MHP/Estim      Timed Activity Minutes Units   Ther Ex 32 2   Manual  8 1       Signature:  Electronically signed by Hemanth Del Valle PT on 10/27/20 at 5:32 PM EDT

## 2020-11-04 NOTE — PROGRESS NOTES
[facility]       Physical Therapy  Cancellation/No-show Note  Patient Name:  Mee Singh  :  1990   Date:  2020  Referring Practitioner: Mina oClon  Diagnosis: Superior labrum anterior-to-posterior (SLAP) tear of right shoulder, Rotator cuff tendonitis, right    Visit Information:  PT Visit Information  Onset Date: 20  PT Insurance Information: Self Pay  Total # of Visits to Date: 4  No Show: 0  Canceled Appointment: 1  Progress Note Counter:  Cx 2020    For today's appointment patient:  [x]  Cancelled  []  Rescheduled appointment  []  No-show   []  Called pt to remind of next appointment     Reason given by patient:  []  Patient ill  []  Conflicting appointment  []  No transportation    []  Conflict with work  []  No reason given  [x]  Other: no       Comments:       Signature: Electronically signed by Payam Laura PTA on 20 at 4:31 PM EST

## 2020-11-05 ENCOUNTER — HOSPITAL ENCOUNTER (OUTPATIENT)
Dept: PHYSICAL THERAPY | Age: 30
Setting detail: THERAPIES SERIES
Discharge: HOME | End: 2020-11-05

## 2020-11-19 ENCOUNTER — HOSPITAL ENCOUNTER (OUTPATIENT)
Dept: PHYSICAL THERAPY | Age: 30
Setting detail: THERAPIES SERIES
Discharge: HOME | End: 2020-11-19

## 2020-11-19 PROCEDURE — G0283 ELEC STIM OTHER THAN WOUND: HCPCS

## 2020-11-19 PROCEDURE — 97110 THERAPEUTIC EXERCISES: CPT

## 2020-11-19 PROCEDURE — 97140 MANUAL THERAPY 1/> REGIONS: CPT

## 2020-11-19 ASSESSMENT — PAIN SCALES - GENERAL: PAINLEVEL_OUTOF10: 8

## 2020-11-19 ASSESSMENT — PAIN DESCRIPTION - LOCATION: LOCATION: SHOULDER

## 2020-11-19 ASSESSMENT — PAIN DESCRIPTION - PAIN TYPE: TYPE: CHRONIC PAIN

## 2020-11-19 ASSESSMENT — PAIN DESCRIPTION - DESCRIPTORS: DESCRIPTORS: ACHING

## 2020-11-19 ASSESSMENT — PAIN DESCRIPTION - ORIENTATION: ORIENTATION: RIGHT

## 2020-11-19 NOTE — PROGRESS NOTES
[address] [address]  Outpatient Physical Therapy    Treatment Note        Date: 2020  Patient: Rasta Parikh  : 1990  ACCT #: [de-identified]  Referring Practitioner: Jessica Dominguez  Diagnosis: Superior labrum anterior-to-posterior (SLAP) tear of right shoulder, Rotator cuff tendonitis, right    Visit Information:  PT Visit Information  Onset Date: 20  PT Insurance Information: Self Pay  Total # of Visits to Date: 5  No Show: 0  Canceled Appointment: 1  Progress Note Counter: -    Subjective: Pt presents after lapse in tx d/t conflict with scheduling. Pt reports having contd improvement in \"stiffness\" though pain conts to be present especially over the last few days after incident at work with child \"pulling\" on her. Pt also reports increased R elbow pain recently d/t compensating for shoulder to avvoid \"catching\" positions.   Comments: RTD 3 months  HEP Compliance:  [] Good [x] Fair [] Poor [] Reports not doing due to:    Vital Signs  Patient Currently in Pain: Yes   Pain Screening  Patient Currently in Pain: Yes  Pain Assessment  Pain Assessment: 0-10  Pain Level: 8  Pain Type: Chronic pain  Pain Location: Shoulder  Pain Orientation: Right  Pain Descriptors: Aching    OBJECTIVE:   Exercises  Exercise 1: wall slides flex x5  Exercise 3: pulleys focus on PROM x4 mins  Exercise 5: finger ladder flexion to L 24 3\"x5 with intermittent assist from L UE to assist  Exercise 20: HEP: cont current    Strength: [] NT  [x] MMT completed:  Strength RUE  Comment: elbow 4+/5 shoudler 3/5 grossly    ROM: [] NT  [x] ROM measurements:  AROM RUE (degrees)  RUE General AROM: shoulder flex 104, abd, 90, IR T12, ER T5(requires assist for eccentric control with pain at end ranges and \"catching\" during shoulder flexion)     Manual:   Manual therapy  Joint mobilization: gentle GH distraction with oscillations  PROM: R shoudler flex, abd x8 mins    Modalities:  Modalities  Moist heat: x10 min with IFC to R shoulder to decrease pain and mm tightness post exertion  Ultrasound: PRN R shoulder*  E-stim (parameters): IFC x10 min to R shoulder with MHP to decrease pain post exertion     *Indicates exercise, modality, or manual techniques to be initiated when appropriate    Assessment: Body structures, Functions, Activity limitations: Decreased functional mobility , Decreased ADL status, Decreased ROM, Decreased strength, Increased pain, Decreased high-level IADLs, Decreased posture  Assessment: Pt has been seen in PT for 5 sessions with good compliance with HEP. She demonstartes improvement in R shoulder AROM, strength, and functional activity tolerance with significant contd limitations for her age. Pt is also with contd significant pain reaching 10/10 with intermittent \"locking and catching\" while performing AROM. This \"catching\" occurs multiple times per day specifically with any overhead and arm extended activty. It is further recommended that pt cont with HEP to maintain current functional gains however f/u with MD d/t contd pain and \"catching/locking\" pains.   Treatment Diagnosis: decreased R shoulder A/PROM, decreased R UE strength, increased R shoulder pain, and decreased fucntional activity tolerance  Prognosis: Good, Fair     Goals:  Short term goals  Time Frame for Short term goals: 3-4 weeks  Short term goal 1: The pt will report decreased R shoulder pain </= 5/10 at worst to increase ease with ADL's    Long term goals  Time Frame for Long term goals : 6-8 weeks  Long term goal 1: The pt will have a increase in UEFI score >/=60/80 in order to increase functional activity tolerance  Long term goal 2: The pt will be indep/compliant with HEP in order to self manage symptoms upon D/C  Long term goal 3: The pt will demo improved R shoulder AROM WNL's in order to increase ease with ADL's  Long term goal 4: The pt will demonstrate improved R UE strength >/=4+/5 in order to lift/carry with decreased pain  Progress

## 2020-11-19 NOTE — PROGRESS NOTES
[address]     Ph: [phone]  Fax: [phone]    [] Certification  [] Recertification []  Plan of Care  [x] Progress Note [] Discharge      To:   Sukumar Sheikh      From:  Aurelia Conner, PT, DPT  Patient: Alex Bang     : 1990  Diagnosis: Superior labrum anterior-to-posterior (SLAP) tear of right shoulder, Rotator cuff tendonitis, right     Date: 2020  Treatment Diagnosis: decreased R shoulder A/PROM, decreased R UE strength, increased R shoulder pain, and decreased fucntional activity tolerance     Progress Report Period from:  10/9/2020  to 2020    Total # of Visits to Date: 5   No Show: 0    Canceled Appointment: 1     OBJECTIVE:   Short Term Goals - Time Frame for Short term goals: 3-4 weeks    Goals Current/Discharge status  Met   Short term goal 1: The pt will report decreased R shoulder pain </= 5/10 at worst to increase ease with ADL's  Intermittent catching and locking pains reaching 10/10 otherwise constant pain ranges from 4-8/10       [] yes  [x] no     Long Term Goals - Time Frame for Long term goals : 6-8 weeks  Goals Current/ Discharge status Met   Long term goal 1: The pt will have a increase in UEFI score >/=60/80 in order to increase functional activity tolerance 33/80 [] yes  [x] no   Long term goal 2: The pt will be indep/compliant with HEP in order to self manage symptoms upon D/C Indep with fair compliance with HEP [x] yes  [x] no   Long term goal 3: The pt will demo improved R shoulder AROM WNL's in order to increase ease with ADL's AROM RUE (degrees)  RUE General AROM: shoulder flex 104, abd, 90, IR T12, ER T5(requires assist for eccentric control with pain at end ranges and \"catching\" during shoulder flexion) [x] yes  [x] no   Long term goal 4: The pt will demonstrate improved R UE strength >/=4+/5 in order to lift/carry with decreased pain Strength WILLIAM  Comment: elbow 4+/5 shoudler 3/5 grossly  Limited by pain  [x] yes  [x] no     Body structures, Functions, Activity limitations: Decreased functional mobility , Decreased ADL status, Decreased ROM, Decreased strength, Increased pain, Decreased high-level IADLs, Decreased posture  Assessment: Pt has been seen in PT for 5 sessions with good compliance with HEP. She demonstartes improvement in R shoulder AROM, strength, and functional activity tolerance with significant contd limitations for her age. Pt is also with contd significant pain reaching 10/10 with intermittent \"locking and catching\" while performing AROM. This \"catching\" occurs multiple times per day specifically with any overhead and arm extended activty. It is further recommended that pt cont with HEP to maintain current functional gains however f/u with MD d/t contd pain and \"catching/locking\" pains. Prognosis: Good, Fair  Discharge Recommendations: Continue to assess pending progress    PLAN:   Frequency/Duration:  Plan  Times per week: 1-2  Plan weeks: 6-8  Current Treatment Recommendations: Strengthening, ROM, Home Exercise Program, Manual Therapy - Soft Tissue Mobilization, Manual Therapy - Joint Manipulation, Patient/Caregiver Education & Training, Modalities  Plan Comment: Hold to f/u with MD     Precautions: SLAP tear                          Patient Status:Hold to f/u with MD       Signature: Electronically signed by Garland Griggs PT on 11/19/20 at 4:46 PM EST    If you have any questions or concerns, please don't hesitate to call. Thank you for your referral.    I have reviewed this plan of care and certify a need for medically necessary rehabilitation services.     Physician Signature:__________________________________________________________  Date:  Please sign and return

## 2020-12-18 ENCOUNTER — OFFICE VISIT (OUTPATIENT)
Dept: ORTHOPEDIC SURGERY | Age: 30
End: 2020-12-18

## 2020-12-18 VITALS
OXYGEN SATURATION: 100 % | HEART RATE: 58 BPM | WEIGHT: 141 LBS | HEIGHT: 64 IN | BODY MASS INDEX: 24.07 KG/M2 | TEMPERATURE: 96.4 F

## 2020-12-18 PROCEDURE — 99213 OFFICE O/P EST LOW 20 MIN: CPT | Performed by: PHYSICIAN ASSISTANT

## 2020-12-18 ASSESSMENT — ENCOUNTER SYMPTOMS
DIARRHEA: 0
VOMITING: 0
COLOR CHANGE: 0
BACK PAIN: 0
WHEEZING: 0
NAUSEA: 0
CONSTIPATION: 0
SHORTNESS OF BREATH: 0
STRIDOR: 0

## 2020-12-18 NOTE — PROGRESS NOTES
[facility]      Subjective:      Chief Complaint   Patient presents with    Follow-up     pt here for f/u of right shoulder, pt states        HPI: Orlando Barry is a 27 y.o. female LHD who is here for a SLAP/rotator cuff tendinitis follow-up. She is been working with physical therapy. Taking Celebrex. She did not tolerate Motrin and Mobic. Still having significant pain and weakness. This especially with internal rotation abduction flexion of the shoulder. She feels like there is periods where her arm will lock on her. Therapy ended a month ago. She has to manipulate it in order to put her arm overhead.   No numbness or tingling down her arm or neck pain    Past Medical History:   Diagnosis Date    Heart murmur     childhood    Hernia of abdominal wall     Umbilical hernia 1/6028      Past Surgical History:   Procedure Laterality Date    HERNIA REPAIR      MS ESOPHAGOGASTRODUODENOSCOPY TRANSORAL DIAGNOSTIC N/A 8/31/2018    EGD ESOPHAGOGASTRODUODENOSCOPY performed by Diaz Colin MD at [address] [facility] [address] N/A 12/2/2016    4.3 cm Ventralex mesh     Social History     Socioeconomic History    Marital status:      Spouse name: Not on file    Number of children: Not on file    Years of education: Not on file    Highest education level: Not on file   Occupational History    Not on file   Social Needs    Financial resource strain: Not on file    Food insecurity     Worry: Not on file     Inability: Not on file   [employer] needs     Medical: Not on file     Non-medical: Not on file   Tobacco Use    Smoking status: Never Smoker    Smokeless tobacco: Never Used   Substance and Sexual Activity    Alcohol use: No    Drug use: No    Sexual activity: Yes     Partners: Male   Lifestyle    Physical activity     Days per week: Not on file     Minutes per session: Not on file    Stress: Not on file Studies:     MRI revealed SLAP lesion at the right shoulder  Laboratory Studies:   Lab Results   Component Value Date    WBC 4.1 (L) 03/13/2017    HGB 11.3 (L) 03/13/2017    HCT 34.9 (L) 03/13/2017    MCV 83.2 03/13/2017     03/13/2017     No results found for: SEDRATE  No results found for: CRP    Assessment and Plan:      Diagnosis Orders   1. Superior labrum anterior-to-posterior (SLAP) tear of right shoulder       Has been following Dr. Osvaldo Vivas for a SLAP lesion, seen on MRI. She has been doing physical therapy and anti-inflammatories. She only tolerates Celebrex, Mobic and Motrin upset her stomach. This has helped a good degree, however she is significant weakness and pain especially with abduction external instrumentation. Dr. Osvaldo Vivas and Aamir Calderon talked about a steroid injection versus arthroscopic surgery. Unfortunately she will not be able to see Dr. Osvaldo Vivas until the end of January and wants to try all modalities of treatment before going into surgery discussions. We will do just that, however, this may prevent her from having surgery in the next 3 months. She understands this consequence. Injection was given in the left arm and therapy was reordered. She needs a refill on her Celebrex to get through some of her sessions she instructed to call us. Otherwise she will see Dr. Osvaldo Vivas when he is next  Available    SHOULDER INJECTION:  Subacromial steroid injection of right shoulder: The risks, benefits, alternatives, procedure, and convalescence for corticosteroid injection of right shoulder subacromial bursa was discussed with patient. The risk discussed included, but were not limited to, infection, limited efficacy, blanching of the skin, localized atrophy of the adipose and muscle tissue, localized contusion and swelling, and persistent or recurrent discomfort. Patient verbalized understanding and agreed to proceed after all questions were answered.     The patient was injected in seated position with the right upper arm adducted against the torso. The lateral and posterolateral aspects of the shoulder were prepped with Betadine solution. Topical cold spray was applied at the planned injection site. An injection consisting of 5 mL of Depo-Medrol and 2 mL of 1% lidocaine was administered from an entry point along posterior lateral extent of the acromion using a syringe and a 21-gauge needle directed toward the coracoid. Hemostasis was achieved using direct pressure, and the injection site was cleansed with alcohol pads and a bandage applied. This was well-tolerated by the patient, and the patient was neurovascularly intact following the procedure. The above plan was discussed in thorough detail with Dr. Carrie Sotomayor and the patient. No orders of the defined types were placed in this encounter. No orders of the defined types were placed in this encounter. No follow-ups on file.     Sena Tirado PA-C  [place] [id number] [facility]  [phone]

## 2020-12-28 ENCOUNTER — TELEPHONE (OUTPATIENT)
Dept: ORTHOPEDIC SURGERY | Age: 30
End: 2020-12-28

## 2020-12-28 NOTE — TELEPHONE ENCOUNTER
Patient called regarding her physical therapy order. Patient states she called to schedule and appointment but PT told her order was .

## 2020-12-30 NOTE — PROGRESS NOTES
[address]     Ph: [phone]  Fax: [phone]    [] Certification  [] Recertification []  Plan of Care  [] Progress Note [x] Discharge      To: Whitney Colvin      From:  Endy Vega, PT, DPT  Patient: Phil Shelley     : 1990  Diagnosis: Superior labrum anterior-to-posterior (SLAP) tear of right shoulder, Rotator cuff tendonitis, right     Date: 2020  Treatment Diagnosis: decreased R shoulder A/PROM, decreased R UE strength, increased R shoulder pain, and decreased fucntional activity tolerance     Progress Report Period from:  2020  to 2020    Total # of Visits to Date: 5   No Show: 0    Canceled Appointment: 1     OBJECTIVE:   Short Term Goals - Time Frame for Short term goals: 3-4 weeks    Goals Current/Discharge status  Met   Short term goal 1: The pt will report decreased R shoulder pain </= 5/10 at worst to increase ease with ADL's  Intermittent catching and locking pains reaching 10/10 otherwise constant pain ranges from 4-8/10        []? yes  [x]? no      Long Term Goals - Time Frame for Long term goals : 6-8 weeks  Goals Current/ Discharge status Met   Long term goal 1: The pt will have a increase in UEFI score >/=60/80 in order to increase functional activity tolerance 33/80 []? yes  [x]? no   Long term goal 2: The pt will be indep/compliant with HEP in order to self manage symptoms upon D/C Indep with fair compliance with HEP [x]? yes  [x]? no   Long term goal 3: The pt will demo improved R shoulder AROM WNL's in order to increase ease with ADL's AROM RUE (degrees)  RUE General AROM: shoulder flex 104, abd, 90, IR T12, ER T5(requires assist for eccentric control with pain at end ranges and \"catching\" during shoulder flexion) [x]?  yes  [x]? no   Long term goal 4: The pt will demonstrate improved R UE strength >/=4+/5 in order to lift/carry with decreased pain Strength WILLIAM  Comment: elbow 4+/5 shoudler 3/5 grossly  Limited by pain  [x]? yes  [x]? no     Body structures, Functions, Activity limitations: Decreased functional mobility , Decreased ADL status, Decreased ROM, Decreased strength, Increased pain, Decreased high-level IADLs, Decreased posture  Assessment: Please refer to PN dated 11/19/20 for formal assessment. Pt failed to return to PT after being placed on hold to f/u with MD d/t significant contd pain and \"catching\" with difficulty progressing. Prognosis: Good, Fair    PLAN: D/C                  Patient Status:[] Continue/ Initiate plan of Care    [x] Discharge PT. Recommend pt continue with HEP. [] Additional visits requested, Please re-certify for additional visits:        Signature: Electronically signed by Quiana Pink PT on 12/30/20 at 4:20 PM EST    If you have any questions or concerns, please don't hesitate to call. Thank you for your referral.    I have reviewed this plan of care and certify a need for medically necessary rehabilitation services.     Physician Signature:__________________________________________________________  Date:  Please sign and return

## 2021-01-07 ENCOUNTER — HOSPITAL ENCOUNTER (OUTPATIENT)
Dept: PHYSICAL THERAPY | Age: 31
Setting detail: THERAPIES SERIES
Discharge: HOME | End: 2021-01-07
Payer: COMMERCIAL

## 2021-01-07 PROCEDURE — 97110 THERAPEUTIC EXERCISES: CPT

## 2021-01-07 PROCEDURE — 97162 PT EVAL MOD COMPLEX 30 MIN: CPT

## 2021-01-07 ASSESSMENT — PAIN DESCRIPTION - PAIN TYPE: TYPE: CHRONIC PAIN

## 2021-01-07 ASSESSMENT — PAIN SCALES - GENERAL: PAINLEVEL_OUTOF10: 0

## 2021-01-07 ASSESSMENT — PAIN DESCRIPTION - DESCRIPTORS: DESCRIPTORS: ACHING;THROBBING

## 2021-01-07 ASSESSMENT — PAIN DESCRIPTION - ORIENTATION: ORIENTATION: RIGHT

## 2021-01-07 ASSESSMENT — PAIN DESCRIPTION - LOCATION: LOCATION: SHOULDER

## 2021-01-07 NOTE — PROGRESS NOTES
[address]     Ph: [phone]  Fax: [phone]    [] Certification  [] Recertification [x]  Plan of Care  [] Progress Note [] Discharge      To:  Neri Joseph PA-C      From:  Roby Magallanes, PT, DPT  Patient: Robert Henderson     : 1990  Diagnosis: Superior labrum anterior-to-posterior (SLAP) tear of right shoulder       Date: 2021  Treatment Diagnosis: limitations in R shoulder end rangle flex, abd, and ER, R UE strenth, mild decreased postural awareness, intermittent R shoulder pain, and R UT tightnesss     Progress Report Period from:  2021  to 2021    Total # of Visits to Date: 1   No Show: 0    Canceled Appointment: 1     OBJECTIVE:   Short Term Goals - Time Frame for Short term goals: 2 weeks    Goals Current/Discharge status  Met   Short term goal 1: The pt will report decreased R shoulder pain </= 2/10 at worst to increase ease with ADL's  4/10 at worst [] yes  [x] no     Long Term Goals - Time Frame for Long term goals : 4 weeks  Goals Current/ Discharge status Met   Long term goal 1: The pt will have a increase in UEFI score >/=60/80 in order to increase functional activity tolerance 46/80 [] yes  [x] no   Long term goal 2: The pt will be indep/compliant with HEP in order to self manage symptoms upon D/C ongoing [] yes  [x] no   Long term goal 3: The pt will demo improved R shoulder AROM WNL's in order to increase ease with ADL's AROM RUE (degrees)  RUE General AROM: shoudler flex 158, ER T3, IR T9, abd 147(\"pulling\" pain at end ranges) [] yes  [x] no   Long term goal 4: The pt will demonstrate improved R UE strength >/=1/2 grade in order to lift/carry and perform ADL's with decreased pain Strength RUE  Comment: elbow flex 4/5 ext 4+/5 shoulder flex, abd 3+/5 ER 4/5 IR 4+/5, MT, rhomboids 3+/5 Lats 4/5 [] yes  [x] no     Body structures, Functions, Activity limitations: Decreased functional mobility , Decreased ADL status, Decreased ROM, Decreased strength, Increased pain, Decreased high-level IADLs, Decreased posture  Assessment: Pt presents after recent f/u with MD and cortisone injection of the R shoulder d/t SLAP tear. She currently presents with limitations in R shoulder end range flex, abd, and ER, R UE strenth, mild decreased postural awareness, intermittent R shoulder pain, and R UT tightnesss. These impairments currently limit her functional abilities to reach, lift, carry, perform overhead ADL's, or ADL's without out stretched arm without increased pain or limiattions. Prognosis: Good  Discharge Recommendations: Continue to assess pending progress    PT Education: Goals;PT Role;Plan of Care;Home Exercise Program    PLAN: [x] Evaluate and Treat  Frequency/Duration:  Plan  Times per week: 1  Plan weeks: 4  Current Treatment Recommendations: Strengthening, ROM, Home Exercise Program, Manual Therapy - Soft Tissue Mobilization, Manual Therapy - Joint Manipulation, Patient/Caregiver Education & Training, Modalities     Precautions: SLAP tear                          Patient Status:[x] Continue/ Initiate plan of Care    [] Discharge PT. Recommend pt continue with HEP. [] Additional visits requested, Please re-certify for additional visits:     Signature: Electronically signed by Elda Albert PT on 1/7/21 at 5:44 PM EST    If you have any questions or concerns, please don't hesitate to call. Thank you for your referral.    I have reviewed this plan of care and certify a need for medically necessary rehabilitation services.     Physician Signature:__________________________________________________________  Date:  Please sign and return

## 2021-01-07 NOTE — PROGRESS NOTES
helped with pain control for ~ 1 week. However, pain increased this week again possibly d/t performing increased activity. Pt denies having \"catching. \" Pt denies compliance with completing HEP. Comments: RTD 1/22/20    Objective:   Strength RUE  Comment: elbow flex 4/5 ext 4+/5 shoulder flex, abd 3+/5 ER 4/5 IR 4+/5, MT, rhomboids 3+/5 Lats 4/5  Strength LUE  Comment: 5/5 shoulder and elbow except MT, Lats, Rhomboids 4+/5 LT 4/5     AROM RUE (degrees)  RUE General AROM: shoudler flex 158, ER T3, IR T9, abd 147(\"pulling\" pain at end ranges)  AROM LUE (degrees)  LUE General AROM: shouler flex 170, abd 165, ER T6, IR T9    Observation/Palpation  Posture: Good  Palpation: (-) TTP R shoulder complex aside from UT; mod tightness R UT  Observation: mild rounded shoudlers    Exercises:   Exercises  Exercise 1: tband rows RTB x10 lats- increased pain with YTB therefore Held  Exercise 2: tband IR/ER RTB x10  Exercise 3: tband tricep extension, bicep curl RTB x15  Exercise 4: UBE*  Exercise 5: prone scap over TG*  Exercise 6: ball stabs*  Exercise 7: rythmis stabilization*  Exercise 8: SA wall slide*  Exercise 20: HEP: tband rows, bicep curl, tricep extension, tband IR/ER  *Indicates exercise,modality, or manual techniques to be initiated when appropriate    Assessment: Body structures, Functions, Activity limitations: Decreased functional mobility , Decreased ADL status, Decreased ROM, Decreased strength, Increased pain, Decreased high-level IADLs, Decreased posture  Assessment: Pt presents after recent f/u with MD and cortisone injection of the R shoulder d/t SLAP tear. She currently presents with limitations in R shoulder end range flex, abd, and ER, R UE strenth, mild decreased postural awareness, intermittent R shoulder pain, and R UT tightnesss.  These impairments currently limit her functional abilities to reach, lift, carry, perform overhead ADL's, or ADL's without out stretched arm without increased pain or limiattions. Prognosis: Good  Discharge Recommendations: Continue to assess pending progress  Activity Tolerance: Patient Tolerated treatment well  Activity Tolerance: Pt with mild c/o \"locking pain\" upon return from lat pulldown with YTB therefore held exercise, otherwise good tolerance to initiation of light strength training     Decision Making: Medium Complexity  History: med  Exam: high; UEFI 46/80  Clinical Presentation: med      Plan  Frequency/Duration:  Plan  Times per week: 1  Plan weeks: 4  Current Treatment Recommendations: Strengthening, ROM, Home Exercise Program, Manual Therapy - Soft Tissue Mobilization, Manual Therapy - Joint Manipulation, Patient/Caregiver Education & Training, Modalities     Patient Education  New Education Provided: PT Education: Goals;PT Role;Plan of Care;Home Exercise Program    POST-PAIN     Pain Rating (0-10 pain scale):  0 /10 At Rest   Location and pain description same as pre-treatment unless indicated. Action: [] NA  [] Call Physician  [x] Perform HEP  [] Meds as prescribed    Evaluation and patient rights have been reviewed and patient agrees with plan of care. Yes  [x]  No  []   Explain:     Naren Fall Risk Assessment  Risk Factor Scale  Score   History of Falls [] Yes  [x] No 25  0    Secondary Diagnosis [] Yes  [x] No 15  0    Ambulatory Aid [] Furniture  [] Crutches/cane/walker  [x] None/bedrest/wheelchair/nurse 30  15  0    IV/Heparin Lock [] Yes  [x] No 20  0    Gait/Transferring [] Impaired  [] Weak  [x] Normal/bedrest/immobile 20  10  0    Mental Status [] Forgets limitations  [x] Oriented to own ability 15  0       Total: 0     Based on the Assessment score: check the appropriate box.   [x]  No intervention needed   Low =   Score of 0-24  []  Use standard prevention interventions Moderate =  Score of 24-44   [] Discuss fall prevention strategies   [] Indicate moderate falls risk on eval  []  Use high risk prevention interventions High = Score of 45 and higher   [] Discuss fall prevention strategies   [] Provide supervision during treatment time    Goals  Short term goals  Time Frame for Short term goals: 2 weeks  Short term goal 1: The pt will report decreased R shoulder pain </= 2/10 at worst to increase ease with ADL's  Long term goals  Time Frame for Long term goals : 4 weeks  Long term goal 1: The pt will have a increase in UEFI score >/=60/80 in order to increase functional activity tolerance  Long term goal 2: The pt will be indep/compliant with HEP in order to self manage symptoms upon D/C  Long term goal 3: The pt will demo improved R shoulder AROM WNL's in order to increase ease with ADL's  Long term goal 4: The pt will demonstrate improved R UE strength >/=1/2 grade in order to lift/carry and perform ADL's with decreased pain    PT Individual Minutes  Time In: 1630  Time Out: 1720  Minutes: 50  Timed Code Treatment Minutes: 12 Minutes  Procedure Minutes: 38 eval     Timed Activity Minutes Units   Ther Ex 12 1     Electronically signed by Zuleyka Hoff PT on 1/7/21 at 5:43 PM EST

## 2021-01-15 ENCOUNTER — HOSPITAL ENCOUNTER (OUTPATIENT)
Dept: PHYSICAL THERAPY | Age: 31
Setting detail: THERAPIES SERIES
Discharge: HOME | End: 2021-01-15
Payer: COMMERCIAL

## 2021-01-15 PROCEDURE — 97110 THERAPEUTIC EXERCISES: CPT

## 2021-01-15 PROCEDURE — G0283 ELEC STIM OTHER THAN WOUND: HCPCS

## 2021-01-15 NOTE — PROGRESS NOTES
[id number] [address]  Outpatient Physical Therapy    Treatment Note        Date: 1/15/2021  Patient: Elizabeth Ho  : 1990  ACCT #: [de-identified]  Referring Practitioner: Reno Gallardo PA-C  Diagnosis: Superior labrum anterior-to-posterior (SLAP) tear of right shoulder    Visit Information:  PT Visit Information  Onset Date: 20  PT Insurance Information: Self Pay  Total # of Visits to Date: 2  No Show: 0  Canceled Appointment: 1  Progress Note Counter:     Subjective: Pt states she took an anti-inflammatory prescribed by the MD and has no pain currently. Pt arrived 11 mins late for session, able to accomodate. Comments: RTD 20  HEP Compliance:  [x] Good [] Fair [] Poor [] Reports not doing due to:    Vital Signs  Patient Currently in Pain: Denies(Denies pain at rest)   Pain Screening  Patient Currently in Pain: Denies(Denies pain at rest)    OBJECTIVE:   Exercises  Exercise 1: tband rows RTB x10 lats- increased pain with YTB therefore Held  Exercise 2: tband IR/ER RTB x10 w/ towel roll for improved alignment/dec pain  Exercise 3: bicep curl 3-way RTB x10; tricep ext RTB x15  Exercise 4: UBE 2'F/2'R L1.0  Exercise 5: shoulder isos 6-way 5''x5 - VC's to keep within a dante ROM  Exercise 6: ball stabs w/ elbow bent for imp dante 4-way x10; consider performing on table NV*  Exercise 8: SA wall slide YTB x5  Exercise 20: HEP: shoulder isos (verbal, pt state she has them at home from prior PT)    Strength: [x] NT  [] MMT completed:     ROM: [x] NT  [] ROM measurements:     Modalities:  Modalities  Moist heat: x10 min with IFC to R shoulder to decrease pain and mm tightness post exertion  Ultrasound: PRN R shoulder*  E-stim (parameters): IFC x10 min to R shoulder with MHP to decrease pain post exertion     *Indicates exercise, modality, or manual techniques to be initiated when appropriate    Assessment:    Body structures, Functions, Activity limitations: Decreased functional mobility , Decreased ADL status, Decreased ROM, Decreased strength, Increased pain, Decreased high-level IADLs, Decreased posture  Assessment: Initiated tx per POC for inc strength, rom, and dec pain. Pt reports difficulty/inc pain w/ movements where elbow is extended such as lat pull downs, and ball stabs. Pt reports difficulty w/ SA wall slides, but pain was tolerable. Concluded w/ MHP and IFC to R shoulder to dec mm soreness post-exertion. Treatment Diagnosis: limitations in R shoulder end rangle flex, abd, and ER, R UE strenth, mild decreased postural awareness, intermittent R shoulder pain, and R UT tightnesss  Prognosis: Good     Goals:  Short term goals  Time Frame for Short term goals: 2 weeks  Short term goal 1: The pt will report decreased R shoulder pain </= 2/10 at worst to increase ease with ADL's  Long term goals  Time Frame for Long term goals : 4 weeks  Long term goal 1: The pt will have a increase in UEFI score >/=60/80 in order to increase functional activity tolerance  Long term goal 2: The pt will be indep/compliant with HEP in order to self manage symptoms upon D/C  Long term goal 3: The pt will demo improved R shoulder AROM WNL's in order to increase ease with ADL's  Long term goal 4: The pt will demonstrate improved R UE strength >/=1/2 grade in order to lift/carry and perform ADL's with decreased pain  Progress toward goals: inc strength, rom, dec pain    POST-PAIN       Pain Rating (0-10 pain scale):   0/10 at rest, 2-3/10 w/ movement  Location and pain description same as pre-treatment unless indicated.    Action: [] NA   [x] Perform HEP  [] Meds as prescribed  [] Modalities as prescribed   [] Call Physician     Frequency/Duration:  Plan  Times per week: 1  Plan weeks: 4  Current Treatment Recommendations: Strengthening, ROM, Home Exercise Program, Manual Therapy - Soft Tissue Mobilization, Manual Therapy - Joint Manipulation, Patient/Caregiver Education & Training, Modalities     Pt to continue current HEP. See objective section for any therapeutic exercise changes, additions or modifications this date.     PT Individual Minutes  Time In: 2658  Time Out: 1620  Minutes: 49  Timed Code Treatment Minutes: 39 Minutes  Procedure Minutes: 10     Timed Activity Minutes Units   Ther Ex 39 3       Signature:  Electronically signed by Anthony Lemus PTA on 1/15/21 at 3:38 PM EST

## 2021-01-22 ENCOUNTER — HOSPITAL ENCOUNTER (OUTPATIENT)
Dept: PHYSICAL THERAPY | Age: 31
Setting detail: THERAPIES SERIES
Discharge: HOME | End: 2021-01-22
Payer: COMMERCIAL

## 2021-01-22 ENCOUNTER — OFFICE VISIT (OUTPATIENT)
Dept: ORTHOPEDIC SURGERY | Age: 31
End: 2021-01-22

## 2021-01-22 VITALS
WEIGHT: 141 LBS | HEIGHT: 64 IN | BODY MASS INDEX: 24.07 KG/M2 | HEART RATE: 65 BPM | TEMPERATURE: 96.9 F | OXYGEN SATURATION: 98 %

## 2021-01-22 DIAGNOSIS — S43.431A SUPERIOR LABRUM ANTERIOR-TO-POSTERIOR (SLAP) TEAR OF RIGHT SHOULDER: Primary | ICD-10-CM

## 2021-01-22 DIAGNOSIS — M75.81 ROTATOR CUFF TENDONITIS, RIGHT: ICD-10-CM

## 2021-01-22 PROCEDURE — 97110 THERAPEUTIC EXERCISES: CPT

## 2021-01-22 PROCEDURE — 99214 OFFICE O/P EST MOD 30 MIN: CPT | Performed by: ORTHOPAEDIC SURGERY

## 2021-01-22 ASSESSMENT — PAIN DESCRIPTION - PAIN TYPE: TYPE: CHRONIC PAIN

## 2021-01-22 ASSESSMENT — PAIN DESCRIPTION - ORIENTATION: ORIENTATION: RIGHT

## 2021-01-22 ASSESSMENT — PAIN DESCRIPTION - DESCRIPTORS: DESCRIPTORS: ACHING;SORE

## 2021-01-22 ASSESSMENT — PAIN DESCRIPTION - LOCATION: LOCATION: SHOULDER

## 2021-01-22 ASSESSMENT — PAIN SCALES - GENERAL: PAINLEVEL_OUTOF10: 5

## 2021-01-22 ASSESSMENT — ENCOUNTER SYMPTOMS: BACK PAIN: 0

## 2021-01-22 NOTE — PROGRESS NOTES
Subjective:      Patient ID: Enedina Gil is a [age] [address] [age]ap y.o. female who presents today for:  Chief Complaint   Patient presents with   [address] Patient     Superior labrum anterior-to-posterior (SLAP) tear of right shoulder / she had an injection in it but feels the shot has worn off. She has also been doing PT. 6/10 on pain scale       HPI  Patient describes that she is having persistent right shoulder pain. It is improved with the physical therapy as well as the corticosteroid injection, however she is having persistent mechanical symptoms in the shoulder when she raises overhead. She is a .     Past Medical History:   Diagnosis Date    Heart murmur     childhood    Hernia of abdominal wall     Umbilical hernia 5/1302      Past Surgical History:   Procedure Laterality Date    HERNIA REPAIR      SD ESOPHAGOGASTRODUODENOSCOPY TRANSORAL DIAGNOSTIC N/A 8/31/2018    EGD ESOPHAGOGASTRODUODENOSCOPY performed by Reagan Madera MD at [address] [facility] [address] N/A 12/2/2016    4.3 cm Ventralex mesh     Social History     Socioeconomic History    Marital status:      Spouse name: Not on file    Number of children: Not on file    Years of education: Not on file    Highest education level: Not on file   Occupational History    Not on file   Social Needs    Financial resource strain: Not on file    Food insecurity     Worry: Not on file     Inability: Not on file   [employer] needs     Medical: Not on file     Non-medical: Not on file   Tobacco Use    Smoking status: Never Smoker    Smokeless tobacco: Never Used   Substance and Sexual Activity    Alcohol use: No    Drug use: No    Sexual activity: Yes     Partners: Male   Lifestyle    Physical activity     Days per week: Not on file     Minutes per session: Not on file    Stress: Not on file   Relationships    Social connections     Talks on phone: Not on file     Gets together: Not on file     Attends Church service: Not on file     Active member of club or organization: Not on file     Attends meetings of clubs or organizations: Not on file     Relationship status: Not on file    Intimate partner violence     Fear of current or ex partner: Not on file     Emotionally abused: Not on file     Physically abused: Not on file     Forced sexual activity: Not on file   Other Topics Concern    Not on file   Social History Narrative    Not on file     Family History   Problem Relation Age of Onset    Other Mother         fibromylagia    Other Father     No Known Problems Sister     No Known Problems Son     No Known Problems Daughter     No Known Problems Son     No Known Problems Daughter      No Known Allergies  Current Outpatient Medications on File Prior to Visit   Medication Sig Dispense Refill    celecoxib (CELEBREX) 200 MG capsule Take 1 capsule by mouth daily 30 capsule 1    ibuprofen (ADVIL;MOTRIN) 800 MG tablet Take 1 tablet by mouth every 6 hours as needed for Pain 40 tablet 1     No current facility-administered medications on file prior to visit. Review of Systems   Constitutional: Negative for fever. Cardiovascular: Negative for leg swelling. Musculoskeletal: Negative for arthralgias, back pain, gait problem, joint swelling and neck pain. Skin: Negative for rash. Neurological: Negative for weakness and numbness. Objective:   Pulse 65   Temp 96.9 °F (36.1 °C) (Temporal)   Ht 5' 3.5\" (1.613 m)   Wt 141 lb (64 kg)   SpO2 98%   BMI 24.59 kg/m²     Ortho Exam  She has no tenderness of the AC joint. She has no proximal biceps tenderness. On the right shoulder her active elevation is to 130 degrees passively 170 degrees. Abduction 90 degrees, external rotation neutral to 60 degrees. Internal rotation 70 degrees. Her supraspinatus, infraspinatus and external rotation strength on the right is 5-5. Subscapularis function is intact.   She has pain with Aiken's and speeds testing. Negative Yergason's testing. Axillary patch is intact. Radial, ulnar, median nerve motor and sensory distribution are intact radial pulses palpable and symmetric bilaterally. Radiographs and Laboratory Studies:     Diagnostic Imaging Studies:    Previous MRI was reviewed which was consistent with a superior labral tear. She had evidence of supraspinatus tendinosis. Laboratory Studies:   Lab Results   Component Value Date    WBC 4.1 (L) 03/13/2017    HGB 11.3 (L) 03/13/2017    HCT 34.9 (L) 03/13/2017    MCV 83.2 03/13/2017     03/13/2017     No results found for: SEDRATE  No results found for: CRP    Assessment:      Diagnosis Orders   1. Superior labrum anterior-to-posterior (SLAP) tear of right shoulder     2. Rotator cuff tendonitis, right            Plan:     We again discussed the natural history of this. We reviewed risks and benefits of nonoperative operative treatment. Patient has been compliant with a nonoperative treatment plan including physical therapy anti-inflammatories and corticosteroid injection. She still having discomfort and mechanical symptoms. She want to consider an operative approach. We discussed right arthroscopic shoulder surgery, subacromial decompression acromioplasty and superior labral repair. We reviewed the incisions,. Immobilization and recovery. We discussed that she would be restricted in terms of her work as a . Regarding the surgical procedure she was informed to risk to include but not limited to persistent pain, recurrent tear, infection, wound complications, arthrofibrosis, DVT, pulmonary embolus, neurovascular injury, complex regional pain syndrome, medical anesthetic risk. She was informed of potential exposure to COVID-19 and the potential medical consequences. All questions were answered and consent was obtained. No orders of the defined types were placed in this encounter.     No orders of the

## 2021-01-22 NOTE — PROGRESS NOTES
[address] [address]  Outpatient Physical Therapy    Treatment Note        Date: 2021  Patient: Vee Andrea  : 1990  ACCT #: [de-identified]  Referring Practitioner: Amanda Acosta PA-C  Diagnosis: Superior labrum anterior-to-posterior (SLAP) tear of right shoulder    Visit Information:  PT Visit Information  Onset Date: 20  PT Insurance Information: Self Pay  Total # of Visits to Date: 3  No Show: 0  Canceled Appointment: 1  Progress Note Counter: 3/4    Subjective: Pt reports she woke up w/ inc pain/stiffness this AM probably d/t how she slept. Pt reports no significant soreness following LV that she remembers. Pt has to leave appt early today d/t follow up scheduled w/ MD at 4:30. Comments: RTD 20  HEP Compliance:  [x] Good [] Fair [] Poor [] Reports not doing due to:    Vital Signs  Patient Currently in Pain: Yes   Pain Screening  Patient Currently in Pain: Yes  Pain Assessment  Pain Level: 5  Pain Type: Chronic pain  Pain Location: Shoulder  Pain Orientation: Right  Pain Descriptors: Aching;Sore(stiff)    OBJECTIVE:   Exercises  Exercise 1: tband rows RTB x12 lats- increased pain with YTB therefore Held; adduction RTB - focus on eccentric control x10  Exercise 2: tband IR/ER RTB x12 w/ towel roll for improved alignment/dec pain  Exercise 4: UBE 2'F/2'R L2.0  Exercise 5: flexion, extension resistive isometric RTB 5''x10 ea  Exercise 6: ball stabs w/ elbow bent for imp dante 4-way x10 on table  Exercise 8: SA wall slide YTB x5  Exercise 20: HEP: cont current    Strength: [x] NT  [] MMT completed:    ROM: [x] NT  [] ROM measurements:    Modalities:  Modalities  Other: pt declined d/t having to go to MD appt     *Indicates exercise, modality, or manual techniques to be initiated when appropriate    Assessment:    Body structures, Functions, Activity limitations: Decreased functional mobility , Decreased ADL status, Decreased ROM, Decreased strength, Increased pain, Decreased high-level IADLs, Decreased posture  Assessment: Pt reports inc muscle soreness throughout session, though denies pain directly in shoulder. Pt cont's to have inc pain in shoulder w/ complete elbow ext. Will cont to progress stabilization ex's as tolerable. Treatment Diagnosis: limitations in R shoulder end rangle flex, abd, and ER, R UE strenth, mild decreased postural awareness, intermittent R shoulder pain, and R UT tightnesss  Prognosis: Good       Goals:  Short term goals  Time Frame for Short term goals: 2 weeks  Short term goal 1: The pt will report decreased R shoulder pain </= 2/10 at worst to increase ease with ADL's    Long term goals  Time Frame for Long term goals : 4 weeks  Long term goal 1: The pt will have a increase in UEFI score >/=60/80 in order to increase functional activity tolerance  Long term goal 2: The pt will be indep/compliant with HEP in order to self manage symptoms upon D/C  Long term goal 3: The pt will demo improved R shoulder AROM WNL's in order to increase ease with ADL's  Long term goal 4: The pt will demonstrate improved R UE strength >/=1/2 grade in order to lift/carry and perform ADL's with decreased pain  Progress toward goals: inc strength, rom, dec pain    POST-PAIN       Pain Rating (0-10 pain scale):   6/10   Location and pain description same as pre-treatment unless indicated. Action: [] NA   [x] Perform HEP  [] Meds as prescribed  [] Modalities as prescribed   [] Call Physician     Frequency/Duration:  Plan  Times per week: 1  Plan weeks: 4  Current Treatment Recommendations: Strengthening, ROM, Home Exercise Program, Manual Therapy - Soft Tissue Mobilization, Manual Therapy - Joint Manipulation, Patient/Caregiver Education & Training, Modalities     Pt to continue current HEP. See objective section for any therapeutic exercise changes, additions or modifications this date.          PT Individual Minutes  Time In: 0371  Time Out: 1615  Minutes: 30  Timed Code Treatment Minutes:

## 2021-02-04 ENCOUNTER — TELEPHONE (OUTPATIENT)
Dept: ORTHOPEDIC SURGERY | Age: 31
End: 2021-02-04

## 2021-02-05 NOTE — TELEPHONE ENCOUNTER
Hopefully on 2/26, will need to confirm with him as Friday schedules are all over the place this month

## 2021-02-15 ENCOUNTER — TELEPHONE (OUTPATIENT)
Dept: ORTHOPEDIC SURGERY | Age: 31
End: 2021-02-15

## 2021-02-15 NOTE — TELEPHONE ENCOUNTER
lmovm to cb in regards to scheduling surgery. Surgery will be 2/26/2021 and a PAT, covid, and post-op will need scheduled. Once all dates have been confirmed with Pt, order will need faxed to surgery. Order is in my surgery folder. Verify Self-pay?

## 2021-02-26 ENCOUNTER — OFFICE VISIT (OUTPATIENT)
Dept: ORTHOPEDIC SURGERY | Age: 31
End: 2021-02-26
Payer: COMMERCIAL

## 2021-02-26 VITALS
BODY MASS INDEX: 24.07 KG/M2 | HEART RATE: 66 BPM | RESPIRATION RATE: 14 BRPM | TEMPERATURE: 98.3 F | OXYGEN SATURATION: 99 % | HEIGHT: 64 IN | WEIGHT: 141 LBS

## 2021-02-26 DIAGNOSIS — M75.81 ROTATOR CUFF TENDONITIS, RIGHT: ICD-10-CM

## 2021-02-26 DIAGNOSIS — S43.431A SUPERIOR LABRUM ANTERIOR-TO-POSTERIOR (SLAP) TEAR OF RIGHT SHOULDER: Primary | ICD-10-CM

## 2021-02-26 PROCEDURE — G8427 DOCREV CUR MEDS BY ELIG CLIN: HCPCS | Performed by: ORTHOPAEDIC SURGERY

## 2021-02-26 PROCEDURE — 1036F TOBACCO NON-USER: CPT | Performed by: ORTHOPAEDIC SURGERY

## 2021-02-26 PROCEDURE — 99214 OFFICE O/P EST MOD 30 MIN: CPT | Performed by: ORTHOPAEDIC SURGERY

## 2021-02-26 PROCEDURE — G8420 CALC BMI NORM PARAMETERS: HCPCS | Performed by: ORTHOPAEDIC SURGERY

## 2021-02-26 PROCEDURE — G8484 FLU IMMUNIZE NO ADMIN: HCPCS | Performed by: ORTHOPAEDIC SURGERY

## 2021-02-26 ASSESSMENT — ENCOUNTER SYMPTOMS: BACK PAIN: 0

## 2021-02-26 NOTE — PROGRESS NOTES
Subjective:      Patient ID: Darylene Dura is a 27 y.o. female who presents today for:  Chief Complaint   Patient presents with    Follow-up     discuss surgery right shoulder or cortisone injection       HPI  Patient describes that the last week the shoulder pain has become more significant. She is having difficulty using the arm. She denies any new symptoms except just exacerbation of the symptoms.     Past Medical History:   Diagnosis Date    Heart murmur     childhood    Hernia of abdominal wall     Umbilical hernia 3/5248      Past Surgical History:   Procedure Laterality Date    HERNIA REPAIR      [place] ESOPHAGOGASTRODUODENOSCOPY TRANSORAL DIAGNOSTIC N/A 8/31/2018    EGD ESOPHAGOGASTRODUODENOSCOPY performed by Joselyn Yepez MD at [address] [facility] [address] N/A 12/2/2016    4.3 cm Ventralex mesh     Social History     Socioeconomic History    Marital status:      Spouse name: Not on file    Number of children: Not on file    Years of education: Not on file    Highest education level: Not on file   Occupational History    Not on file   Social Needs    Financial resource strain: Not on file    Food insecurity     Worry: Not on file     Inability: Not on file    Transportation needs     Medical: Not on file     Non-medical: Not on file   Tobacco Use    Smoking status: Never Smoker    Smokeless tobacco: Never Used   Substance and Sexual Activity    Alcohol use: No    Drug use: No    Sexual activity: Yes     Partners: Male   Lifestyle    Physical activity     Days per week: Not on file     Minutes per session: Not on file    Stress: Not on file   Relationships    Social connections     Talks on phone: Not on file     Gets together: Not on file     Attends Orthodoxy service: Not on file     Active member of club or organization: Not on file     Attends meetings of clubs or organizations: Not on file     Relationship status: Not on file   Bárbara Jon Intimate partner violence     Fear of current or ex partner: Not on file     Emotionally abused: Not on file     Physically abused: Not on file     Forced sexual activity: Not on file   Other Topics Concern    Not on file   Social History Narrative    Not on file     Family History   Problem Relation Age of Onset    Other Mother         fibromylagia    Other Father     No Known Problems Sister     No Known Problems Son     No Known Problems Daughter     No Known Problems Son     No Known Problems Daughter      No Known Allergies  Current Outpatient Medications on File Prior to Visit   Medication Sig Dispense Refill    celecoxib (CELEBREX) 200 MG capsule Take 1 capsule by mouth daily 30 capsule 1    ibuprofen (ADVIL;MOTRIN) 800 MG tablet Take 1 tablet by mouth every 6 hours as needed for Pain 40 tablet 1     No current facility-administered medications on file prior to visit. Review of Systems   Constitutional: Negative for fever. Cardiovascular: Negative for leg swelling. Musculoskeletal: Negative for arthralgias, back pain, gait problem, joint swelling and neck pain. Skin: Negative for rash. Neurological: Negative for weakness and numbness. Objective:   Pulse 66   Temp 98.3 °F (36.8 °C) (Temporal)   Resp 14   Ht 5' 3.5\" (1.613 m)   Wt 141 lb (64 kg)   SpO2 99%   BMI 24.59 kg/m²     Ortho Exam  Range of motion is 170/60/70 on the right 180/60/70 on the left. She has pain with Neer and Barahona maneuvers. She has pain with Dickson's and speeds testing. Neurovascular exam is intact. Radiographs and Laboratory Studies:     Diagnostic Imaging Studies:    Prior studies were consistent with superior labral tear and rotator cuff tendinosis.     Laboratory Studies:   Lab Results   Component Value Date    WBC 4.1 (L) 03/13/2017    HGB 11.3 (L) 03/13/2017    HCT 34.9 (L) 03/13/2017    MCV 83.2 03/13/2017     03/13/2017     No results found for: SEDRATE  No results found for: CRP    Assessment:      Diagnosis Orders   1. Superior labrum anterior-to-posterior (SLAP) tear of right shoulder     2. Rotator cuff tendonitis, right            Plan:     We again reviewed the natural history of these diagnoses. We discussed nonoperative and operative treatment options. She wants to proceed with an operative approach. We discussed right arthroscopic shoulder surgery subacromial decompression acromioplasty and superior labral repair. We discussed the incisions,. Immobilization and recovery. She was informed of risk to include but not limited to persistent pain, recurrent tear, infection, wound complications, arthrofibrosis, DVT, pulmonary embolus, neurovascular injury, complex regional pain syndrome, medical anesthetic risk. All questions were answered and consent was obtained. No orders of the defined types were placed in this encounter. No orders of the defined types were placed in this encounter. Return for Post operative. Kriss Goodwin MD            Surgery Phone: [phone]   [facility]   Surgery Fax: [phone]    Phone: [phone]          Fax: [phone]    Orthopedics: Surgery Scheduling, PAT & PRE-OP Order Form  Call to advance [place] at [phone] at least 24 hours prior to date of service     Surgery Location: [address] [place] [address] Surgery: [address] [place] [address]  OFFICE   Surgeon's Pat Lay MD Surgery Date:   Time:    Patient's Name: Alka Loving : 1990    Gender: female  Home Phone:  [phone] Cell Phone: [phone]  Emergency Contact:  Phillip Willams   Phone: [phone]  Payor: Wade Flores /  /  /    ID No.: [id number]      PROVIDER TO COMPLETE:  Diagnosis: Right superior labral tear and rotator cuff tendinosis. Procedure/Consent: Right arthroscopic shoulder surgery, subacromial decompression acromioplasty, superior labral repair. Case Comments/Implants: Mitek suture anchors.   Surgery Scheduled as: Outpatient  Anesthesia Requested: General and Regional Block  Referring Family Doctor: KARLENE Deluna  [x] [facility] PAT Date/Time:                                                            [x] History & Physical [] Physician will Provide [] Attached [] Dictated [] Other  [x] Follow Anesthesia Pre-Op Orders for X-rays, [facility]     [x] SN & PT to evaluate and treat/educate disease management, medications, home safety & equipment needs for total joint patients  [] Other: ____________________________________________________  Consults: Medical/Cardiac Clearance done by  ____________________  PRE-OP ORDERS:   Allergies: Patient has no known allergies.  Latex Allergies:             Diabetic:           [] IV ________________________  [x] IV Start with J-loop     Preprinted Orders: Attached [] Yes [] No   ANTIBIOTIC PRE-OP: [x] ANCEF 2 gram IVPB if > 120 kg 3 grams IVPB within 1 hour of incision, if ALLERGIC, use VANCOMYCIN 1 gram IV, 2 hours pre-op  [] TXA Protocol [] Other:   [x] NPO   [] Betablocker (if needed) _____________________________________   [] Knee high anti-embolic hose [] Thigh high anti-embolic hose   Other: ______________________________________________________    Physician Signature Required:    Date/Time: 2/26/2021

## 2021-03-23 ENCOUNTER — TELEPHONE (OUTPATIENT)
Dept: ORTHOPEDIC SURGERY | Age: 31
End: 2021-03-23

## 2021-03-23 NOTE — TELEPHONE ENCOUNTER
Herbert Cervantes spoke with patient in regards to surgery scheduling with Dr. Clarnce Cabot. Patient stated that she would like to hold off on surgery until May 2021. Patient stated she will contact office when she is ready to schedule surgery. Surgery sheet can be found in Office visit 2/26/21.     When patient contacts office, Dr. Clarnce Cabot will need to be contacted and asked when he would like to add patient to surgery schedule

## 2021-03-26 ENCOUNTER — OFFICE VISIT (OUTPATIENT)
Dept: FAMILY MEDICINE CLINIC | Age: 31
End: 2021-03-26
Payer: COMMERCIAL

## 2021-03-26 VITALS
DIASTOLIC BLOOD PRESSURE: 60 MMHG | BODY MASS INDEX: 24.07 KG/M2 | TEMPERATURE: 98.1 F | HEART RATE: 64 BPM | OXYGEN SATURATION: 99 % | WEIGHT: 141 LBS | SYSTOLIC BLOOD PRESSURE: 106 MMHG | HEIGHT: 64 IN | RESPIRATION RATE: 18 BRPM

## 2021-03-26 DIAGNOSIS — K21.9 GASTROESOPHAGEAL REFLUX DISEASE, UNSPECIFIED WHETHER ESOPHAGITIS PRESENT: ICD-10-CM

## 2021-03-26 DIAGNOSIS — M67.911 TENDINOPATHY OF RIGHT SHOULDER: ICD-10-CM

## 2021-03-26 DIAGNOSIS — R53.83 FATIGUE, UNSPECIFIED TYPE: ICD-10-CM

## 2021-03-26 DIAGNOSIS — K58.0 IRRITABLE BOWEL SYNDROME WITH DIARRHEA: ICD-10-CM

## 2021-03-26 DIAGNOSIS — R11.0 NAUSEA: ICD-10-CM

## 2021-03-26 DIAGNOSIS — E55.9 VITAMIN D DEFICIENCY: ICD-10-CM

## 2021-03-26 DIAGNOSIS — Z00.00 ROUTINE PHYSICAL EXAMINATION: Primary | ICD-10-CM

## 2021-03-26 PROCEDURE — 99214 OFFICE O/P EST MOD 30 MIN: CPT | Performed by: PHYSICIAN ASSISTANT

## 2021-03-26 PROCEDURE — G8427 DOCREV CUR MEDS BY ELIG CLIN: HCPCS | Performed by: PHYSICIAN ASSISTANT

## 2021-03-26 PROCEDURE — G8420 CALC BMI NORM PARAMETERS: HCPCS | Performed by: PHYSICIAN ASSISTANT

## 2021-03-26 PROCEDURE — G8484 FLU IMMUNIZE NO ADMIN: HCPCS | Performed by: PHYSICIAN ASSISTANT

## 2021-03-26 PROCEDURE — 1036F TOBACCO NON-USER: CPT | Performed by: PHYSICIAN ASSISTANT

## 2021-03-26 RX ORDER — ONDANSETRON 8 MG/1
8 TABLET, ORALLY DISINTEGRATING ORAL EVERY 12 HOURS PRN
Qty: 60 TABLET | Refills: 0 | Status: SHIPPED | OUTPATIENT
Start: 2021-03-26 | End: 2021-11-20

## 2021-03-26 ASSESSMENT — ENCOUNTER SYMPTOMS
DIARRHEA: 1
NAUSEA: 1
ABDOMINAL PAIN: 1
BLOOD IN STOOL: 0

## 2021-03-26 ASSESSMENT — PATIENT HEALTH QUESTIONNAIRE - PHQ9
SUM OF ALL RESPONSES TO PHQ QUESTIONS 1-9: 0
SUM OF ALL RESPONSES TO PHQ QUESTIONS 1-9: 0
1. LITTLE INTEREST OR PLEASURE IN DOING THINGS: 0

## 2021-03-26 NOTE — PROGRESS NOTES
vitSubjective  Spraggs Loud, 27 y.o. female presents today with:  Chief Complaint   Patient presents with    Annual Exam     Pt. is here today for an annual exam       HPI  Patient is here for routine physical exam complains of being extremely fatigued cycles regular but heavy flow and cramping. She often has abdominal cramping and nausea worsen doing her cycles stools are often loose. She does have a hiatal hernia  She is contemplating surgery to repair the tendon to her right shoulder states it locks at times causing intense pain  Review of Systems   Constitutional: Positive for fatigue. Gastrointestinal: Positive for abdominal pain, diarrhea and nausea. Negative for blood in stool. Musculoskeletal: Positive for arthralgias. All other systems reviewed and are negative.         Past Medical History:   Diagnosis Date    Heart murmur     childhood    Hernia of abdominal wall     Umbilical hernia 8/5834     Past Surgical History:   Procedure Laterality Date    HERNIA REPAIR      [place] ESOPHAGOGASTRODUODENOSCOPY TRANSORAL DIAGNOSTIC N/A 8/31/2018    EGD ESOPHAGOGASTRODUODENOSCOPY performed by Ladan Garcia MD at [address] [facility] [address] N/A 12/2/2016    4.3 cm Ventralex mesh     Social History     Socioeconomic History    Marital status:      Spouse name: Not on file    Number of children: Not on file    Years of education: Not on file    Highest education level: Not on file   Occupational History    Not on file   Social Needs    Financial resource strain: Not on file    Food insecurity     Worry: Not on file     Inability: Not on file   [employer] needs     Medical: Not on file     Non-medical: Not on file   Tobacco Use    Smoking status: Never Smoker    Smokeless tobacco: Never Used   Substance and Sexual Activity    Alcohol use: No    Drug use: No    Sexual activity: Yes     Partners: Male   Lifestyle    Physical activity Days per week: Not on file     Minutes per session: Not on file    Stress: Not on file   Relationships    Social connections     Talks on phone: Not on file     Gets together: Not on file     Attends Islam service: Not on file     Active member of club or organization: Not on file     Attends meetings of clubs or organizations: Not on file     Relationship status: Not on file    Intimate partner violence     Fear of current or ex partner: Not on file     Emotionally abused: Not on file     Physically abused: Not on file     Forced sexual activity: Not on file   Other Topics Concern    Not on file   Social History Narrative    Not on file     Family History   Problem Relation Age of Onset    Other Mother         fibromylagia    Other Father     No Known Problems Sister     No Known Problems Son     No Known Problems Daughter     No Known Problems Son     No Known Problems Daughter       No Known Allergies  Current Outpatient Medications   Medication Sig Dispense Refill    ondansetron (ZOFRAN-ODT) 8 MG TBDP disintegrating tablet Take 1 tablet by mouth every 12 hours as needed for Nausea or Vomiting 60 tablet 0    ibuprofen (ADVIL;MOTRIN) 800 MG tablet Take 1 tablet by mouth every 6 hours as needed for Pain 40 tablet 1    celecoxib (CELEBREX) 200 MG capsule Take 1 capsule by mouth daily 30 capsule 1     No current facility-administered medications for this visit. Objective    Vitals:    03/26/21 1348   BP: 106/60   Pulse: 64   Resp: 18   Temp: 98.1 °F (36.7 °C)   TempSrc: Oral   SpO2: 99%   Weight: 141 lb (64 kg)   Height: 5' 3.5\" (1.613 m)     Physical Exam  Constitutional:       General: She is not in acute distress. Appearance: Normal appearance. She is not ill-appearing. HENT:      Head: Normocephalic and atraumatic. Eyes:      Extraocular Movements: Extraocular movements intact.       Conjunctiva/sclera: Conjunctivae normal.      Pupils: Pupils are equal, round, and reactive to light.   Neck:      Musculoskeletal: Normal range of motion and neck supple. Thyroid: No thyromegaly. Cardiovascular:      Rate and Rhythm: Normal rate and regular rhythm. Heart sounds: Normal heart sounds. No murmur. Pulmonary:      Effort: Pulmonary effort is normal. No respiratory distress. Breath sounds: Normal breath sounds. No wheezing. Abdominal:      General: Bowel sounds are normal.      Palpations: Abdomen is soft. There is no mass. Tenderness: There is abdominal tenderness in the right lower quadrant. There is no guarding. Musculoskeletal: Normal range of motion. General: Tenderness present. Right shoulder: She exhibits tenderness and spasm. She exhibits normal range of motion. Cervical back: She exhibits tenderness and spasm. Lymphadenopathy:      Cervical: No cervical adenopathy. Skin:     General: Skin is warm and dry. Coloration: Skin is not jaundiced or pale. Neurological:      General: No focal deficit present. Mental Status: She is alert and oriented to person, place, and time. Cranial Nerves: No cranial nerve deficit. Motor: No weakness. Coordination: Coordination normal.      Gait: Gait normal.   Psychiatric:         Mood and Affect: Mood normal.         Behavior: Behavior normal.         Thought Content: Thought content normal.         Judgment: Judgment normal.              Assessment & Plan    Diagnosis Orders   1. Routine physical examination  Comprehensive Metabolic Panel    CBC Auto Differential    TSH with Reflex    Lipid, Fasting    Urinalysis   2. Fatigue, unspecified type  Vitamin D 25 Hydroxy    Vitamin B12 & Folate   3. Vitamin D deficiency  Vitamin D 25 Hydroxy   4. Gastroesophageal reflux disease, unspecified whether esophagitis present      rec pepcid otc   5. Nausea  ondansetron (ZOFRAN-ODT) 8 MG TBDP disintegrating tablet   6.  Irritable bowel syndrome with diarrhea  ondansetron (ZOFRAN-ODT) 8 MG TBDP Encounter   Procedures    Comprehensive Metabolic Panel     Standing Status:   Future     Standing Expiration Date:   3/26/2022    CBC Auto Differential     Standing Status:   Future     Standing Expiration Date:   3/26/2022    TSH with Reflex     Standing Status:   Future     Standing Expiration Date:   3/26/2022    Lipid, Fasting     Standing Status:   Future     Standing Expiration Date:   3/26/2022    Urinalysis     Standing Status:   Future     Standing Expiration Date:   3/26/2022    Vitamin D 25 Hydroxy     Standing Status:   Future     Standing Expiration Date:   3/26/2022    Vitamin B12 & Folate     Standing Status:   Future     Standing Expiration Date:   3/26/2022     Orders Placed This Encounter   Medications    ondansetron (ZOFRAN-ODT) 8 MG TBDP disintegrating tablet     Sig: Take 1 tablet by mouth every 12 hours as needed for Nausea or Vomiting     Dispense:  60 tablet     Refill:  0     There are no discontinued medications. Return in about 1 year (around 3/26/2022) for repeat labs.     Urszula Holland PA-C

## 2021-03-27 DIAGNOSIS — E55.9 VITAMIN D DEFICIENCY: ICD-10-CM

## 2021-03-27 DIAGNOSIS — R53.83 FATIGUE, UNSPECIFIED TYPE: ICD-10-CM

## 2021-03-27 DIAGNOSIS — Z00.00 ROUTINE PHYSICAL EXAMINATION: ICD-10-CM

## 2021-03-27 LAB
ALBUMIN SERPL-MCNC: 3.9 G/DL (ref 3.5–4.6)
ALP BLD-CCNC: 60 U/L (ref 40–130)
ALT SERPL-CCNC: 8 U/L (ref 0–33)
ANION GAP SERPL CALCULATED.3IONS-SCNC: 10 MEQ/L (ref 9–15)
AST SERPL-CCNC: 13 U/L (ref 0–35)
BACTERIA: ABNORMAL /HPF
BASOPHILS ABSOLUTE: 0 K/UL (ref 0–0.2)
BASOPHILS RELATIVE PERCENT: 0.9 %
BILIRUB SERPL-MCNC: 0.3 MG/DL (ref 0.2–0.7)
BILIRUBIN URINE: NEGATIVE
BLOOD, URINE: ABNORMAL
BUN BLDV-MCNC: 9 MG/DL (ref 6–20)
CALCIUM SERPL-MCNC: 9 MG/DL (ref 8.5–9.9)
CHLORIDE BLD-SCNC: 107 MEQ/L (ref 95–107)
CHOLESTEROL, FASTING: 165 MG/DL (ref 0–199)
CLARITY: CLEAR
CO2: 22 MEQ/L (ref 20–31)
COLOR: YELLOW
CREAT SERPL-MCNC: 0.52 MG/DL (ref 0.5–0.9)
EOSINOPHILS ABSOLUTE: 0.2 K/UL (ref 0–0.7)
EOSINOPHILS RELATIVE PERCENT: 4 %
EPITHELIAL CELLS, UA: ABNORMAL /HPF (ref 0–5)
FOLATE: 11.5 NG/ML (ref 7.3–26.1)
GFR AFRICAN AMERICAN: >60
GFR NON-AFRICAN AMERICAN: >60
GLOBULIN: 3.2 G/DL (ref 2.3–3.5)
GLUCOSE BLD-MCNC: 81 MG/DL (ref 70–99)
GLUCOSE URINE: NEGATIVE MG/DL
HCT VFR BLD CALC: 32.6 % (ref 37–47)
HDLC SERPL-MCNC: 52 MG/DL (ref 40–59)
HEMOGLOBIN: 10.9 G/DL (ref 12–16)
HYALINE CASTS: ABNORMAL /HPF (ref 0–5)
KETONES, URINE: NEGATIVE MG/DL
LDL CHOLESTEROL CALCULATED: 103 MG/DL (ref 0–129)
LEUKOCYTE ESTERASE, URINE: ABNORMAL
LYMPHOCYTES ABSOLUTE: 1.9 K/UL (ref 1–4.8)
LYMPHOCYTES RELATIVE PERCENT: 42.2 %
MCH RBC QN AUTO: 28.3 PG (ref 27–31.3)
MCHC RBC AUTO-ENTMCNC: 33.4 % (ref 33–37)
MCV RBC AUTO: 84.7 FL (ref 82–100)
MONOCYTES ABSOLUTE: 0.4 K/UL (ref 0.2–0.8)
MONOCYTES RELATIVE PERCENT: 8.6 %
NEUTROPHILS ABSOLUTE: 2 K/UL (ref 1.4–6.5)
NEUTROPHILS RELATIVE PERCENT: 44.3 %
NITRITE, URINE: NEGATIVE
PDW BLD-RTO: 13.1 % (ref 11.5–14.5)
PH UA: 5.5 (ref 5–9)
PLATELET # BLD: 326 K/UL (ref 130–400)
POTASSIUM SERPL-SCNC: 4 MEQ/L (ref 3.4–4.9)
PROTEIN UA: NEGATIVE MG/DL
RBC # BLD: 3.84 M/UL (ref 4.2–5.4)
RBC UA: ABNORMAL /HPF (ref 0–5)
SODIUM BLD-SCNC: 139 MEQ/L (ref 135–144)
SPECIFIC GRAVITY UA: 1.02 (ref 1–1.03)
TOTAL PROTEIN: 7.1 G/DL (ref 6.3–8)
TRIGLYCERIDE, FASTING: 48 MG/DL (ref 0–150)
TSH REFLEX: 2.83 UIU/ML (ref 0.44–3.86)
UROBILINOGEN, URINE: 0.2 E.U./DL
VITAMIN B-12: 1079 PG/ML (ref 232–1245)
VITAMIN D 25-HYDROXY: 18 NG/ML (ref 30–100)
WBC # BLD: 4.5 K/UL (ref 4.8–10.8)
WBC UA: ABNORMAL /HPF (ref 0–5)

## 2021-03-29 DIAGNOSIS — E55.9 VITAMIN D DEFICIENCY: Primary | ICD-10-CM

## 2021-03-29 DIAGNOSIS — D50.0 IRON DEFICIENCY ANEMIA DUE TO CHRONIC BLOOD LOSS: ICD-10-CM

## 2021-03-29 RX ORDER — METHOCARBAMOL 750 MG/1
50000 TABLET ORAL WEEKLY
Qty: 4 CAPSULE | Refills: 3 | Status: SHIPPED | OUTPATIENT
Start: 2021-03-29 | End: 2021-04-15

## 2021-03-29 RX ORDER — FERROUS SULFATE 325(65) MG
325 TABLET ORAL
Qty: 90 TABLET | Refills: 1 | Status: SHIPPED | OUTPATIENT
Start: 2021-03-29 | End: 2021-11-20

## 2021-03-31 RX ORDER — CHOLECALCIFEROL (VITAMIN D3) 1250 MCG
1 CAPSULE ORAL WEEKLY
Qty: 4 CAPSULE | Refills: 3 | Status: SHIPPED | OUTPATIENT
Start: 2021-03-31 | End: 2021-11-20

## 2021-03-31 RX ORDER — FERROUS SULFATE 325(65) MG
325 TABLET ORAL
Qty: 90 TABLET | Refills: 1 | Status: SHIPPED | OUTPATIENT
Start: 2021-03-31 | End: 2021-04-15

## 2021-03-31 RX ORDER — DOCUSATE SODIUM 100 MG/1
100 CAPSULE, LIQUID FILLED ORAL 2 TIMES DAILY
Qty: 60 CAPSULE | Refills: 2 | Status: SHIPPED | OUTPATIENT
Start: 2021-03-31 | End: 2021-04-15

## 2021-04-15 ENCOUNTER — OFFICE VISIT (OUTPATIENT)
Dept: OBGYN CLINIC | Age: 31
End: 2021-04-15
Payer: COMMERCIAL

## 2021-04-15 VITALS
DIASTOLIC BLOOD PRESSURE: 72 MMHG | HEIGHT: 64 IN | SYSTOLIC BLOOD PRESSURE: 118 MMHG | BODY MASS INDEX: 24.07 KG/M2 | WEIGHT: 141 LBS

## 2021-04-15 DIAGNOSIS — Z01.419 PAP SMEAR, AS PART OF ROUTINE GYNECOLOGICAL EXAMINATION: Primary | ICD-10-CM

## 2021-04-15 DIAGNOSIS — Z11.51 SCREENING FOR HUMAN PAPILLOMAVIRUS: ICD-10-CM

## 2021-04-15 PROCEDURE — 99395 PREV VISIT EST AGE 18-39: CPT | Performed by: OBSTETRICS & GYNECOLOGY

## 2021-04-15 ASSESSMENT — ENCOUNTER SYMPTOMS
DIARRHEA: 0
BLOOD IN STOOL: 0
VOMITING: 0
EYES NEGATIVE: 1
CONSTIPATION: 0
ANAL BLEEDING: 0
RESPIRATORY NEGATIVE: 1
RECTAL PAIN: 0
ABDOMINAL PAIN: 0
ALLERGIC/IMMUNOLOGIC NEGATIVE: 1
NAUSEA: 0
ABDOMINAL DISTENTION: 0

## 2021-04-15 ASSESSMENT — PATIENT HEALTH QUESTIONNAIRE - PHQ9
2. FEELING DOWN, DEPRESSED OR HOPELESS: 0
SUM OF ALL RESPONSES TO PHQ QUESTIONS 1-9: 0

## 2021-04-15 ASSESSMENT — VISUAL ACUITY: OU: 1

## 2021-04-15 NOTE — PROGRESS NOTES
The patient was asked if she would like a chaperone present for her intimate exam. She  declines the chaperone.  Cecilia

## 2021-04-15 NOTE — PROGRESS NOTES
tobacco: Never Used   Substance and Sexual Activity    Alcohol use: No    Drug use: No    Sexual activity: Yes     Partners: Male   Lifestyle    Physical activity     Days per week: Not on file     Minutes per session: Not on file    Stress: Not on file   Relationships    Social connections     Talks on phone: Not on file     Gets together: Not on file     Attends Advent service: Not on file     Active member of club or organization: Not on file     Attends meetings of clubs or organizations: Not on file     Relationship status: Not on file    Intimate partner violence     Fear of current or ex partner: Not on file     Emotionally abused: Not on file     Physically abused: Not on file     Forced sexual activity: Not on file   Other Topics Concern    Not on file   Social History Narrative    Not on file     Family History   Problem Relation Age of Onset    Other Mother         fibromylagia    Other Father     No Known Problems Sister     No Known Problems Son     No Known Problems Daughter     No Known Problems Son     No Known Problems Daughter        Review of Systems   Constitutional: Negative. Negative for activity change, appetite change, chills, diaphoresis, fatigue, fever and unexpected weight change. HENT: Negative. Eyes: Negative. Respiratory: Negative. Cardiovascular: Negative. Gastrointestinal: Negative for abdominal distention, abdominal pain, anal bleeding, blood in stool, constipation, diarrhea, nausea, rectal pain and vomiting. Endocrine: Negative. Genitourinary: Negative for decreased urine volume, difficulty urinating, dyspareunia, dysuria, enuresis, flank pain, frequency, genital sores, hematuria, menstrual problem, pelvic pain, urgency, vaginal bleeding, vaginal discharge and vaginal pain. Musculoskeletal: Negative. Skin: Negative. Allergic/Immunologic: Negative. Neurological: Negative. Hematological: Negative.     Psychiatric/Behavioral: Negative. Objective:     Physical Exam  Constitutional:       Appearance: She is well-developed. HENT:      Head: Normocephalic. Eyes:      General: Lids are normal. Vision grossly intact. Neck:      Musculoskeletal: Normal range of motion and neck supple. Thyroid: No thyromegaly. Cardiovascular:      Rate and Rhythm: Normal rate and regular rhythm. Heart sounds: Normal heart sounds. Pulmonary:      Effort: Pulmonary effort is normal. No respiratory distress. Breath sounds: Normal breath sounds. No wheezing or rales. Chest:      Chest wall: No tenderness. Breasts:         Right: Normal. No swelling, bleeding, inverted nipple, mass, nipple discharge, skin change or tenderness. Left: Normal. No swelling, bleeding, inverted nipple, mass, nipple discharge, skin change or tenderness. Abdominal:      General: There is no distension. Palpations: Abdomen is soft. There is no mass. Tenderness: There is no abdominal tenderness. There is no guarding or rebound. Hernia: No hernia is present. There is no hernia in the left inguinal area or right inguinal area. Genitourinary:     General: Normal vulva. Pubic Area: No rash. Labia:         Right: No rash, tenderness, lesion or injury. Left: No rash, tenderness, lesion or injury. Urethra: No prolapse, urethral swelling or urethral lesion. Vagina: Normal. No signs of injury and foreign body. No vaginal discharge, erythema, tenderness or bleeding. Cervix: No cervical motion tenderness, discharge or friability. Uterus: Not deviated, not enlarged, not fixed and not tender. Adnexa:         Right: No mass, tenderness or fullness. Left: No mass, tenderness or fullness. Rectum: Normal.   Musculoskeletal: Normal range of motion. General: No tenderness. Lymphadenopathy:      Cervical: No cervical adenopathy.       Upper Body:      Right upper body: No supraclavicular or axillary adenopathy. Left upper body: No supraclavicular or axillary adenopathy. Lower Body: No right inguinal adenopathy. No left inguinal adenopathy. Skin:     General: Skin is warm and dry. Coloration: Skin is not pale. Findings: No erythema or rash. Neurological:      Mental Status: She is alert and oriented to person, place, and time. Psychiatric:         Behavior: Behavior normal.         Thought Content: Thought content normal.         Judgment: Judgment normal.         Assessment:       Diagnosis Orders   1. Pap smear, as part of routine gynecological examination  PAP SMEAR   2. Screening for human papillomavirus  PAP SMEAR        Plan:      Medications placedthis encounter:  No orders of the defined types were placed in this encounter. Orders placedthis encounter:  Orders Placed This Encounter   Procedures    PAP SMEAR     Patient History:    No LMP recorded. OBGYN Status: Having periods  Past Surgical History:  No date: HERNIA REPAIR  8/31/2018: [place] ESOPHAGOGASTRODUODENOSCOPY TRANSORAL DIAGNOSTIC; N/A      Comment:  EGD ESOPHAGOGASTRODUODENOSCOPY performed by Felisa Gupta MD at [facility]  No date: TUBAL LIGATION  [birth year]/0[birth year]1945: [address] 69; N/A      Comment:  4.3 cm Ventralex mesh      Social History    Tobacco Use      Smoking status: Never Smoker      Smokeless tobacco: Never Used       Standing Status:   Future     Standing Expiration Date:   4/15/2022     Order Specific Question:   Collection Type     Answer: Thin Prep     Order Specific Question:   Prior Abnormal Pap Test     Answer:   No     Order Specific Question:   Screening or Diagnostic     Answer:   Screening     Order Specific Question:   HPV Requested?      Answer:   Yes     Comments:   16/18     Order Specific Question:   High Risk Patient     Answer:   N/A         Follow up:  Return in about 1 year (around 4/15/2022) for Annual.   Repeat Annual GYN

## 2021-04-21 DIAGNOSIS — Z11.51 SCREENING FOR HUMAN PAPILLOMAVIRUS: ICD-10-CM

## 2021-04-21 DIAGNOSIS — Z01.419 PAP SMEAR, AS PART OF ROUTINE GYNECOLOGICAL EXAMINATION: ICD-10-CM

## 2021-04-21 NOTE — LETTER
ANDRES COHN [facility]  [address] [address]  Phone: [phone]  Fax: [phone], [place]        April 21, 2021    [address]       Deasina Peralta: The results of your most recent Pap smear are normal. This means that no cancerous or precancerous cells were seen. We recommend that you come back in 1 year for your next routine Pap smear. If you have any questions or concerns, please don't hesitate to call.     Sincerely,        Saulo Frank MD

## 2021-08-09 ENCOUNTER — OFFICE VISIT (OUTPATIENT)
Dept: FAMILY MEDICINE CLINIC | Age: 31
End: 2021-08-09
Payer: COMMERCIAL

## 2021-08-09 VITALS
BODY MASS INDEX: 23.49 KG/M2 | DIASTOLIC BLOOD PRESSURE: 84 MMHG | HEIGHT: 64 IN | SYSTOLIC BLOOD PRESSURE: 118 MMHG | TEMPERATURE: 98.4 F | OXYGEN SATURATION: 98 % | HEART RATE: 65 BPM | WEIGHT: 137.6 LBS

## 2021-08-09 DIAGNOSIS — J01.01 ACUTE RECURRENT MAXILLARY SINUSITIS: Primary | ICD-10-CM

## 2021-08-09 PROCEDURE — 99213 OFFICE O/P EST LOW 20 MIN: CPT | Performed by: PHYSICIAN ASSISTANT

## 2021-08-09 PROCEDURE — 1036F TOBACCO NON-USER: CPT | Performed by: PHYSICIAN ASSISTANT

## 2021-08-09 PROCEDURE — G8427 DOCREV CUR MEDS BY ELIG CLIN: HCPCS | Performed by: PHYSICIAN ASSISTANT

## 2021-08-09 PROCEDURE — G8420 CALC BMI NORM PARAMETERS: HCPCS | Performed by: PHYSICIAN ASSISTANT

## 2021-08-09 RX ORDER — AMOXICILLIN AND CLAVULANATE POTASSIUM 875; 125 MG/1; MG/1
1 TABLET, FILM COATED ORAL 2 TIMES DAILY
Qty: 20 TABLET | Refills: 0 | Status: SHIPPED | OUTPATIENT
Start: 2021-08-09 | End: 2021-08-19

## 2021-08-09 RX ORDER — LORATADINE AND PSEUDOEPHEDRINE SULFATE 5; 120 MG/1; MG/1
1 TABLET, EXTENDED RELEASE ORAL 2 TIMES DAILY
Qty: 60 TABLET | Refills: 5 | Status: SHIPPED | OUTPATIENT
Start: 2021-08-09 | End: 2022-11-02

## 2021-08-09 RX ORDER — FLUCONAZOLE 150 MG/1
150 TABLET ORAL ONCE
Qty: 1 TABLET | Refills: 1 | Status: SHIPPED | OUTPATIENT
Start: 2021-08-09 | End: 2021-08-09

## 2021-08-09 SDOH — ECONOMIC STABILITY: FOOD INSECURITY: WITHIN THE PAST 12 MONTHS, THE FOOD YOU BOUGHT JUST DIDN'T LAST AND YOU DIDN'T HAVE MONEY TO GET MORE.: NEVER TRUE

## 2021-08-09 SDOH — ECONOMIC STABILITY: FOOD INSECURITY: WITHIN THE PAST 12 MONTHS, YOU WORRIED THAT YOUR FOOD WOULD RUN OUT BEFORE YOU GOT MONEY TO BUY MORE.: NEVER TRUE

## 2021-08-09 ASSESSMENT — ENCOUNTER SYMPTOMS
SINUS PRESSURE: 1
COUGH: 1
BACK PAIN: 1
CHEST TIGHTNESS: 1
SINUS PAIN: 1

## 2021-08-09 ASSESSMENT — SOCIAL DETERMINANTS OF HEALTH (SDOH): HOW HARD IS IT FOR YOU TO PAY FOR THE VERY BASICS LIKE FOOD, HOUSING, MEDICAL CARE, AND HEATING?: NOT HARD AT ALL

## 2021-08-09 NOTE — PROGRESS NOTES
Subjective  Mee Singh, 32 y.o. female presents today with:  Chief Complaint   Patient presents with    Sinusitis     Pt here for possible sinus infection, sx; sinus pressure, cough, congestion, no sneezing, headaches. She got a covid-19 test last week and tested negative. Took nyquil but it's harsh on her stomach.  Back Pain     Pt is experiencing back pain for last 3 days. It's aching feeling, muscles hurt when breathing, feels tight. HPI  Sinus pain and cough productive of thick mucous ear pain and sorethroat    Review of Systems   Constitutional: Positive for fatigue. HENT: Positive for congestion, ear pain, sinus pressure and sinus pain. Respiratory: Positive for cough and chest tightness. Genitourinary:        Lmp last week   Musculoskeletal: Positive for back pain. Neurological: Positive for light-headedness and headaches.          Past Medical History:   Diagnosis Date    Heart murmur     childhood    Hernia of abdominal wall     Umbilical hernia 1/7438     Past Surgical History:   Procedure Laterality Date    HERNIA REPAIR      [place] ESOPHAGOGASTRODUODENOSCOPY TRANSORAL DIAGNOSTIC N/A 8/31/2018    EGD ESOPHAGOGASTRODUODENOSCOPY performed by Rafael Sin MD at [address] [facility] [address] N/A 12/2/2016    4.3 cm Ventralex mesh     Social History     Socioeconomic History    Marital status:      Spouse name: Not on file    Number of children: Not on file    Years of education: Not on file    Highest education level: Not on file   Occupational History    Not on file   Tobacco Use    Smoking status: Never Smoker    Smokeless tobacco: Never Used   Vaping Use    Vaping Use: Never used   Substance and Sexual Activity    Alcohol use: No    Drug use: No    Sexual activity: Yes     Partners: Male   Other Topics Concern    Not on file   Social History Narrative    Not on file     Social Determinants of Health     Financial Resource Strain: Low Risk     Difficulty of Paying Living Expenses: Not hard at all   Food Insecurity: No Food Insecurity    Worried About Running Out of Food in the Last Year: Never true    Asha of Food in the Last Year: Never true   Transportation Needs:     Lack of Transportation (Medical):      Lack of Transportation (Non-Medical):    Physical Activity:     Days of Exercise per Week:     Minutes of Exercise per Session:    Stress:     Feeling of Stress :    Social Connections:     Frequency of Communication with Friends and Family:     Frequency of Social Gatherings with Friends and Family:     Attends Zoroastrianism Services:     Active Member of Clubs or Organizations:     Attends Club or Organization Meetings:     Marital Status:    Intimate Partner Violence:     Fear of Current or Ex-Partner:     Emotionally Abused:     Physically Abused:     Sexually Abused:      Family History   Problem Relation Age of Onset    Other Mother         fibromylagia    Other Father     No Known Problems Sister     No Known Problems Son     No Known Problems Daughter     No Known Problems Son     No Known Problems Daughter       No Known Allergies  Current Outpatient Medications   Medication Sig Dispense Refill    loratadine-pseudoephedrine (CLARITIN-D 12 HOUR) 5-120 MG per extended release tablet Take 1 tablet by mouth 2 times daily 60 tablet 5    amoxicillin-clavulanate (AUGMENTIN) 875-125 MG per tablet Take 1 tablet by mouth 2 times daily for 10 days 20 tablet 0    fluconazole (DIFLUCAN) 150 MG tablet Take 1 tablet by mouth once for 1 dose 1 tablet 1    Cholecalciferol (VITAMIN D3) 1.25 MG (77148 UT) CAPS Take 1 capsule by mouth once a week (Patient not taking: Reported on 8/9/2021) 4 capsule 3    ferrous sulfate (IRON 325) 325 (65 Fe) MG tablet Take 1 tablet by mouth daily (with breakfast) (Patient not taking: Reported on 8/9/2021) 90 tablet 1    ondansetron (ZOFRAN-ODT) 8 MG TBDP disintegrating tablet Take 1 tablet by mouth every 12 hours as needed for Nausea or Vomiting (Patient not taking: Reported on 4/15/2021) 60 tablet 0    celecoxib (CELEBREX) 200 MG capsule Take 1 capsule by mouth daily 30 capsule 1     No current facility-administered medications for this visit. Objective    Vitals:    08/09/21 1128   BP: 118/84   Pulse: 65   Temp: 98.4 °F (36.9 °C)   TempSrc: Oral   SpO2: 98%   Weight: 137 lb 9.6 oz (62.4 kg)   Height: 5' 4\" (1.626 m)     Physical Exam  Constitutional:       Appearance: Normal appearance. HENT:      Head: Normocephalic and atraumatic. Nose: Congestion present. Eyes:      Extraocular Movements: Extraocular movements intact. Conjunctiva/sclera: Conjunctivae normal.      Pupils: Pupils are equal, round, and reactive to light. Cardiovascular:      Rate and Rhythm: Normal rate and regular rhythm. Pulses: Normal pulses. Heart sounds: No murmur heard. Pulmonary:      Effort: Pulmonary effort is normal. No respiratory distress. Breath sounds: No wheezing. Abdominal:      General: Bowel sounds are normal.   Musculoskeletal:         General: Normal range of motion. Cervical back: Normal range of motion. Skin:     General: Skin is warm and dry. Coloration: Skin is not jaundiced or pale. Neurological:      General: No focal deficit present. Mental Status: She is alert and oriented to person, place, and time. Gait: Gait normal.   Psychiatric:         Mood and Affect: Mood normal.         Thought Content: Thought content normal.         Judgment: Judgment normal.              Assessment & Plan    Diagnosis Orders   1. Acute recurrent maxillary sinusitis           No orders of the defined types were placed in this encounter.     Orders Placed This Encounter   Medications    loratadine-pseudoephedrine (CLARITIN-D 12 HOUR) 5-120 MG per extended release tablet     Sig: Take 1 tablet by mouth 2 times daily     Dispense:  60 tablet Refill:  5    amoxicillin-clavulanate (AUGMENTIN) 875-125 MG per tablet     Sig: Take 1 tablet by mouth 2 times daily for 10 days     Dispense:  20 tablet     Refill:  0    fluconazole (DIFLUCAN) 150 MG tablet     Sig: Take 1 tablet by mouth once for 1 dose     Dispense:  1 tablet     Refill:  1     There are no discontinued medications. Return if symptoms worsen or fail to improve.     Urszula Holland PA-C

## 2021-11-20 ENCOUNTER — OFFICE VISIT (OUTPATIENT)
Dept: FAMILY MEDICINE CLINIC | Age: 31
End: 2021-11-20

## 2021-11-20 VITALS
BODY MASS INDEX: 23.05 KG/M2 | OXYGEN SATURATION: 98 % | DIASTOLIC BLOOD PRESSURE: 70 MMHG | TEMPERATURE: 96.4 F | WEIGHT: 135 LBS | HEIGHT: 64 IN | HEART RATE: 63 BPM | SYSTOLIC BLOOD PRESSURE: 118 MMHG

## 2021-11-20 DIAGNOSIS — Z20.822 CONTACT WITH AND (SUSPECTED) EXPOSURE TO COVID-19: Primary | ICD-10-CM

## 2021-11-20 PROCEDURE — 99213 OFFICE O/P EST LOW 20 MIN: CPT | Performed by: PHYSICIAN ASSISTANT

## 2021-11-20 ASSESSMENT — ENCOUNTER SYMPTOMS
SORE THROAT: 0
SHORTNESS OF BREATH: 0
COUGH: 0
SINUS PAIN: 0
SINUS PRESSURE: 0
ABDOMINAL PAIN: 0
CHEST TIGHTNESS: 0
BACK PAIN: 0
NAUSEA: 0
DIARRHEA: 0
VOMITING: 0

## 2021-11-20 ASSESSMENT — VISUAL ACUITY: OU: 1

## 2021-11-20 NOTE — PROGRESS NOTES
[address] Encounter  CHIEF COMPLAINT       Chief Complaint   Patient presents with    Covid Testing     concern for covid        HISTORY OF PRESENT ILLNESS   Nicki Toscano is a 32 y.o. female who presents with:  HPI   The patient is presenting today with CC COVID-19 concerns. Patient's niece, who lives with her, tested positive on Wednesday. Patient has two COVID-19 Rapid antigen tests. Both negative. No fevers, chills, nausea, or vomiting. REVIEW OF SYSTEMS     Review of Systems   Constitutional: Negative for activity change, appetite change, chills and fever. HENT: Negative for congestion, drooling, sinus pressure, sinus pain and sore throat. Eyes: Negative for visual disturbance. Respiratory: Negative for cough, chest tightness and shortness of breath. Cardiovascular: Negative for chest pain. Gastrointestinal: Negative for abdominal pain, diarrhea, nausea and vomiting. Endocrine: Negative for cold intolerance. Genitourinary: Negative for dysuria, flank pain, frequency and hematuria. Musculoskeletal: Negative for arthralgias and back pain. Skin: Negative for rash. Allergic/Immunologic: Negative for food allergies. Neurological: Negative for weakness, light-headedness, numbness and headaches. Hematological: Does not bruise/bleed easily. PAST MEDICAL HISTORY         Diagnosis Date    Heart murmur     childhood    Hernia of abdominal wall     Umbilical hernia 8/4541     SURGICAL HISTORY     Patient  has a past surgical history that includes Tubal ligation; Umbilical hernia repair (N/A, 12/2/2016); hernia repair; and pr esophagogastroduodenoscopy transoral diagnostic (N/A, 8/31/2018). CURRENT MEDICATIONS       Previous Medications    LORATADINE-PSEUDOEPHEDRINE (CLARITIN-D 12 HOUR) 5-120 MG PER EXTENDED RELEASE TABLET    Take 1 tablet by mouth 2 times daily     ALLERGIES     Patient is has No Known Allergies.   FAMILY HISTORY Patient'sfamily history includes No Known Problems in her daughter, daughter, sister, son, and son; Other in her father and mother. SOCIAL HISTORY     Patient  reports that she has never smoked. She has never used smokeless tobacco. She reports that she does not drink alcohol and does not use drugs. PHYSICAL EXAM     VITALS  BP: 118/70, Temp: 96.4 °F (35.8 °C), Pulse: 63,  , SpO2: 98 %  Physical Exam  Vitals and nursing note reviewed. Constitutional:       General: She is awake. She is not in acute distress. Appearance: Normal appearance. She is well-developed. She is not ill-appearing, toxic-appearing or diaphoretic. HENT:      Head: Normocephalic and atraumatic. Right Ear: Hearing and external ear normal.      Left Ear: Hearing and external ear normal.      Nose: Nose normal.   Eyes:      General: Lids are normal. Vision grossly intact. Gaze aligned appropriately. Conjunctiva/sclera: Conjunctivae normal.   Cardiovascular:      Rate and Rhythm: Normal rate and regular rhythm. Pulses: Normal pulses. Heart sounds: Normal heart sounds, S1 normal and S2 normal.   Pulmonary:      Effort: Pulmonary effort is normal.      Breath sounds: Normal breath sounds and air entry. Musculoskeletal:      Cervical back: Normal range of motion. Skin:     General: Skin is warm. Capillary Refill: Capillary refill takes less than 2 seconds. Neurological:      Mental Status: She is alert and oriented to person, place, and time. Gait: Gait is intact. Psychiatric:         Attention and Perception: Attention normal.         Mood and Affect: Mood normal.         Speech: Speech normal.         Behavior: Behavior normal. Behavior is cooperative. READY CARE COURSE   Labs:  No results found for this visit on 11/20/21. IMAGING:  No orders to display     Scheduled Meds:  Continuous Infusions:  PRN Meds:. PROCEDURES:  FINAL IMPRESSION      1.  Contact with and (suspected) exposure to covid-19 DISPOSITION/PLAN     1. The patient was advised to remain isolated after their COVID-19 test pending their results. The patient was informed that the results will take 2-3 days and that someone will contact the patient of their results. 2. We'll call with COVID-19 results  Results will also be available on your [facility] account, if activated    Things to Know:     - Do not leave home except to get medical care while waiting for your results  - Testing does not change treatment- there is no medication that treats COVID-19  - Stay well-hydrated, rest as needed  - May take over the counter medicine acetaminophen (aka Tylenol) for pain or fever if needed according to the directions on the box if tolerated no allergies  - Monitor your symptoms + contact a medical provider if symptoms are worsening- such as difficulty breathing  - Follow social distancing guidelines and wear a face mask when leaving home is necessary  - Symptoms may develop 2 days to 2 weeks following exposure to the virus- if you are exposed after testing, or if you were in the incubation period when tested, you could become ill with COVID-19 later    Seek emergency medical care immediately (ER/ call 911) if you have trouble breathing, persistent pain or pressure in the chest, new confusion, inability to wake or stay awake, bluish lips or face, or any other signs/ symptoms that you think could be an emergency. On this date 11/20/2021 I have spent 20 minutes reviewing previous notes, test results and face to face with the patient discussing the diagnosis and importance of compliance with the treatment plan as well as documenting on the day of the visit. PATIENT REFERRED TO:  Return if symptoms worsen or fail to improve. DISCHARGE MEDICATIONS:  New Prescriptions    No medications on file     Cannot display discharge medications since this is not an admission.        Chris Jordan

## 2021-11-21 DIAGNOSIS — Z20.822 CONTACT WITH AND (SUSPECTED) EXPOSURE TO COVID-19: ICD-10-CM

## 2021-11-23 LAB
SARS-COV-2: NOT DETECTED
SOURCE: NORMAL

## 2022-01-14 ENCOUNTER — OFFICE VISIT (OUTPATIENT)
Dept: FAMILY MEDICINE CLINIC | Age: 32
End: 2022-01-14

## 2022-01-14 VITALS
HEART RATE: 100 BPM | SYSTOLIC BLOOD PRESSURE: 110 MMHG | TEMPERATURE: 98.1 F | DIASTOLIC BLOOD PRESSURE: 82 MMHG | OXYGEN SATURATION: 98 % | RESPIRATION RATE: 18 BRPM

## 2022-01-14 DIAGNOSIS — R50.81 FEVER IN OTHER DISEASES: Primary | ICD-10-CM

## 2022-01-14 DIAGNOSIS — U07.1 COVID-19: ICD-10-CM

## 2022-01-14 LAB
INFLUENZA A ANTIBODY: NEGATIVE
INFLUENZA B ANTIBODY: NEGATIVE
Lab: ABNORMAL
PERFORMING INSTRUMENT: ABNORMAL
QC PASS/FAIL: ABNORMAL
SARS-COV-2, POC: DETECTED

## 2022-01-14 PROCEDURE — 87426 SARSCOV CORONAVIRUS AG IA: CPT | Performed by: NURSE PRACTITIONER

## 2022-01-14 PROCEDURE — 99213 OFFICE O/P EST LOW 20 MIN: CPT | Performed by: NURSE PRACTITIONER

## 2022-01-14 PROCEDURE — 87804 INFLUENZA ASSAY W/OPTIC: CPT | Performed by: NURSE PRACTITIONER

## 2022-01-14 RX ORDER — FLUTICASONE PROPIONATE 50 MCG
2 SPRAY, SUSPENSION (ML) NASAL DAILY
Qty: 16 G | Refills: 0 | Status: SHIPPED | OUTPATIENT
Start: 2022-01-14 | End: 2022-11-02

## 2022-01-14 ASSESSMENT — ENCOUNTER SYMPTOMS
DIARRHEA: 0
COUGH: 1
FACIAL SWELLING: 0
RHINORRHEA: 1
CHEST TIGHTNESS: 0
STRIDOR: 0
SINUS PRESSURE: 1
SINUS PAIN: 1
NAUSEA: 0
TROUBLE SWALLOWING: 0
SORE THROAT: 1
ABDOMINAL PAIN: 0
SHORTNESS OF BREATH: 0
WHEEZING: 0
VOMITING: 0
VOICE CHANGE: 0

## 2022-01-14 NOTE — PATIENT INSTRUCTIONS
Patient Education        Learning About Coronavirus [phone])  What is coronavirus (COVID-19)? COVID-19 is a disease caused by a type of coronavirus. This illness was first found in December 2019. It has since spread worldwide. Coronaviruses are a large group of viruses. They cause the common cold. They also cause more serious illnesses like [place] respiratory syndrome (MERS) and severe acute respiratory syndrome (SARS). COVID-19 is caused by a novel coronavirus. That means it's a new type that has not been seen in people before. What are the symptoms? COVID-19 symptoms may include:  · Fever. · Cough. · Trouble breathing. · Chills or repeated shaking with chills. · Muscle and body aches. · Headache. · Sore throat. · New loss of taste or smell. · Vomiting. · Diarrhea. In severe cases, COVID-19 can cause pneumonia and make it hard to breathe without help from a machine. It can cause death. How is it diagnosed? COVID-19 is diagnosed with a viral test. This may also be called a PCR test or antigen test. It looks for evidence of the virus in your breathing passages or lungs (respiratory system). The test is most often done on a sample from the nose, throat, or lungs. It's sometimes done on a sample of saliva. One way a sample is collected is by putting a long swab into the back of your nose. How is it treated? Mild cases of COVID-19 can be treated at home. Serious cases need treatment in the hospital. Treatment may include medicines to reduce symptoms, plus breathing support such as oxygen therapy or a ventilator. Some people may be placed on their belly to help their oxygen levels. Treatments that may help people who have COVID-19 include:  Antiviral medicines. These medicines treat viral infections. Remdesivir is an example. Immune-based therapy. These medicines help the immune system fight COVID-19. Examples include monoclonal antibodies. Blood thinners.   These medicines help prevent blood clots. People with severe illness are at risk for blood clots. How can you protect yourself and others? · Get vaccinated. · Avoid sick people. · Cover your mouth with a tissue when you cough or sneeze. · Wash your hands often, especially after you cough or sneeze. Use soap and water, and scrub for at least 20 seconds. If soap and water aren't available, use an alcohol-based hand . · Avoid touching your mouth, nose, and eyes. Be sure to follow all instructions from the [facility] and your local health authorities. Here are some examples of specific precautions you may need to take. · If you are not fully vaccinated:  ? Wear a mask if you have to go to public areas. ? Avoid crowds and try to stay at least 6 feet away from other people. · If you have been exposed to the virus and are not fully vaccinated:  ? Stay home. Don't go to school, work, or public areas. And don't use public transportation, ride-shares, or taxis unless you have no choice. ? Wear a mask if you have to go to public areas, like the pharmacy. · Even if you're fully vaccinated, there's still a small chance you can get and spread COVID-19. If you live in an area where COVID-19 is spreading quickly, wear a mask if you have to go to indoor public areas. You might also want to wear a mask in crowded outdoor areas if you:  ? Have certain health conditions. ? Live with someone who has a compromised immune system. ? Live with someone who is not fully vaccinated. · If you have been exposed and you are fully vaccinated:  ? Talk to your doctor. You may need a COVID-19 test.  ? Wear a mask in public indoor spaces for 14 days or until you test negative for COVID-19. If you're sick:  · Leave your home only if you need to get medical care. But call the doctor's office first so they know you're coming. And wear a mask. · Wear a mask whenever you're around other people. · Limit contact with pets and people in your home.  If possible, stay in a separate bedroom and use a separate bathroom. · Clean and disinfect your home every day. Use household  and disinfectant wipes or sprays. Take special care to clean things that you touch with your hands. How can you self-isolate when you have COVID-19? If you have COVID-19, there are things you can do to help avoid spreading the virus to others. · Limit contact with people in your home. If possible, stay in a separate bedroom and use a separate bathroom. · Wear a mask when you are around other people. · If you have to leave home, avoid crowds and try to stay at least 6 feet away from other people. · Avoid contact with pets and other animals. · Cover your mouth and nose with a tissue when you cough or sneeze. Then throw it in the trash right away. · Wash your hands often, especially after you cough or sneeze. Use soap and water, and scrub for at least 20 seconds. If soap and water aren't available, use an alcohol-based hand . · Don't share personal household items. These include bedding, towels, cups and glasses, and eating utensils. · [address] in the warmest water allowed for the fabric type, and dry it completely. It's okay to wash other people's laundry with yours. · Clean and disinfect your home. Use household  and disinfectant wipes or sprays. When should you call for help? Call 911 anytime you think you may need emergency care. For example, call if you have life-threatening symptoms, such as:    · You have severe trouble breathing. (You can't talk at all.)     · You have constant chest pain or pressure.     · You are severely dizzy or lightheaded.     · You are confused or can't think clearly.     · You have pale, gray, or blue-colored skin or lips.     · You pass out (lose consciousness) or are very hard to wake up. Call your doctor now or seek immediate medical care if:    · You have moderate trouble breathing.  (You can't speak a full sentence.)     · You are coughing up blood (more than about 1 teaspoon).     · You have signs of low blood pressure. These include feeling lightheaded; being too weak to stand; and having cold, pale, clammy skin. Watch closely for changes in your health, and be sure to contact your doctor if:    · Your symptoms get worse.     · You are not getting better as expected.     · You have new or worse symptoms of anxiety, depression, nightmares, or flashbacks. Call before you go to the doctor's office. Follow their instructions. And wear a mask. Current as of: July 1, 2021               Content Version: 13.1  © 2006-2021 [employer]. Care instructions adapted under license by [facility] ([facility]). If you have questions about a medical condition or this instruction, always ask your healthcare professional. [address] any warranty or liability for your use of this information.

## 2022-01-14 NOTE — PROGRESS NOTES
Subjective:      Patient ID: Linda Pineda is a 32 y.o. female who presents today for:  Chief Complaint   Patient presents with    Concern For COVID-19     fever, headache, ear pain and itchy eyes        HPI   Patient is here with c/o score throat, itchy eyes and nasal drainage for the last 4 days. She is taking Zyrtec. Says she slept more on Tuesday. Wednesday woke up with HA. Says she took some Advil for the HA. Fever 99.3 on Thursday. Fever went down with medication. Thursday fever came back up. Says she has some pain in the ears and still has mild HA/pressure. Says eyes are sore. She has been using some cool compresses. Says she still has some PND. Says she has no SOB and no chest congestion. Says she has no GI sx. She has no loss of taste or smell. Says she has no underlrying health issues. Says she is not aware of anyone that she came in contact with.      Past Medical History:   Diagnosis Date    Heart murmur     childhood    Hernia of abdominal wall     Umbilical hernia 8/2700     Past Surgical History:   Procedure Laterality Date    HERNIA REPAIR      MD ESOPHAGOGASTRODUODENOSCOPY TRANSORAL DIAGNOSTIC N/A 8/31/2018    EGD ESOPHAGOGASTRODUODENOSCOPY performed by Toyin Scott MD at [facility] [address] N/A 12/2/2016    4.3 cm Ventralex mesh     Social History     Socioeconomic History    Marital status:      Spouse name: Not on file    Number of children: Not on file    Years of education: Not on file    Highest education level: Not on file   Occupational History    Not on file   Tobacco Use    Smoking status: Never Smoker    Smokeless tobacco: Never Used   Vaping Use    Vaping Use: Never used   Substance and Sexual Activity    Alcohol use: No    Drug use: No    Sexual activity: Yes     Partners: Male   Other Topics Concern    Not on file   Social History Narrative    Not on file     Social Determinants of Health     Financial Resource Strain: Low Risk     Difficulty of Paying Living Expenses: Not hard at all   Food Insecurity: No Food Insecurity    Worried About Running Out of Food in the Last Year: Never true    Asha of Food in the Last Year: Never true   Transportation Needs:     Lack of Transportation (Medical): Not on file    Lack of Transportation (Non-Medical):  Not on file   Physical Activity:     Days of Exercise per Week: Not on file    Minutes of Exercise per Session: Not on file   Stress:     Feeling of Stress : Not on file   Social Connections:     Frequency of Communication with Friends and Family: Not on file    Frequency of Social Gatherings with Friends and Family: Not on file    Attends Sabianism Services: Not on file    Active Member of [address] [place] [address] [employer] or Organizations: Not on file    Attends Club or Organization Meetings: Not on file    Marital Status: Not on file   Intimate Partner Violence:     Fear of Current or Ex-Partner: Not on file    Emotionally Abused: Not on file    Physically Abused: Not on file    Sexually Abused: Not on file   Housing Stability:     Unable to Pay for Housing in the Last Year: Not on file    Number of [place] in the Last Year: Not on file    Unstable Housing in the Last Year: Not on file     Family History   Problem Relation Age of Onset    Other Mother         fibromylagia    Other Father     No Known Problems Sister     No Known Problems Son     No Known Problems Daughter     No Known Problems Son     No Known Problems Daughter      No Known Allergies  Current Outpatient Medications   Medication Sig Dispense Refill    fluticasone (FLONASE) 50 MCG/ACT nasal spray 2 sprays by Each Nostril route daily 16 g 0    loratadine-pseudoephedrine (CLARITIN-D 12 HOUR) 5-120 MG per extended release tablet Take 1 tablet by mouth 2 times daily (Patient not taking: Reported on 1/14/2022) 60 tablet 5     No current facility-administered medications for this visit. Review of Systems   Constitutional: Positive for chills, fatigue and fever (low grade). Negative for activity change, appetite change, diaphoresis and unexpected weight change. HENT: Positive for congestion, ear pain, postnasal drip, rhinorrhea, sinus pressure, sinus pain and sore throat. Negative for ear discharge, facial swelling, sneezing, tinnitus, trouble swallowing and voice change. Respiratory: Positive for cough. Negative for chest tightness, shortness of breath, wheezing and stridor. Cardiovascular: Negative for chest pain. Gastrointestinal: Negative for abdominal pain, diarrhea, nausea and vomiting. Musculoskeletal: Negative for arthralgias and myalgias. Skin: Negative for rash. Neurological: Positive for headaches. Negative for dizziness, weakness and light-headedness. Objective:   /82 (Site: Left Upper Arm, Position: Sitting, Cuff Size: Medium Adult)   Pulse 100   Temp 98.1 °F (36.7 °C) (Temporal)   Resp 18   SpO2 98%     Physical Exam  Vitals reviewed. Constitutional:       General: She is awake. She is not in acute distress. Appearance: Normal appearance. She is well-developed, well-groomed and normal weight. She is not ill-appearing, toxic-appearing or diaphoretic. HENT:      Head: Normocephalic and atraumatic. Right Ear: Hearing, ear canal and external ear normal. A middle ear effusion is present. Tympanic membrane is bulging. Tympanic membrane is not injected or erythematous. Left Ear: Hearing, ear canal and external ear normal. A middle ear effusion is present. Tympanic membrane is bulging. Tympanic membrane is not injected or erythematous. Nose: Congestion and rhinorrhea present. Mouth/Throat:      Mouth: Mucous membranes are moist.      Pharynx: Oropharynx is clear. No oropharyngeal exudate or posterior oropharyngeal erythema. Eyes:      General: Lids are normal.      Extraocular Movements: Extraocular movements intact. Conjunctiva/sclera: Conjunctivae normal.   Cardiovascular:      Rate and Rhythm: Normal rate and regular rhythm. Pulses: Normal pulses. Heart sounds: Normal heart sounds, S1 normal and S2 normal.   Pulmonary:      Effort: Pulmonary effort is normal.      Breath sounds: Normal breath sounds and air entry. Musculoskeletal:         General: Normal range of motion. Cervical back: Normal range of motion and neck supple. Lymphadenopathy:      Cervical: Cervical adenopathy present. Right cervical: Superficial cervical adenopathy present. Left cervical: Superficial cervical adenopathy present. Skin:     General: Skin is warm and dry. Capillary Refill: Capillary refill takes less than 2 seconds. Neurological:      General: No focal deficit present. Mental Status: She is alert and oriented to person, place, and time. Mental status is at baseline. Psychiatric:         Attention and Perception: Attention and perception normal.         Mood and Affect: Mood and affect normal.         Speech: Speech normal.         Behavior: Behavior normal. Behavior is cooperative. Thought Content: Thought content normal.         Cognition and Memory: Cognition and memory normal.         Judgment: Judgment normal.         Assessment:       Diagnosis Orders   1. Fever in other diseases  POCT COVID-19, Antigen    POCT Influenza A/B   2. COVID-19       Results for POC orders placed in visit on 01/14/22   POCT Influenza A/B   Result Value Ref Range    Influenza A Ab Negative     Influenza B Ab Negative       Plan:     Assessment & Plan   Berdie Mask was seen today for concern for covid-19. Diagnoses and all orders for this visit:    Discussed with patient Covid testing is +, flu neg. Given copy of testing. Discussed mitigation and quarantine. Advised to cont to monitor symptoms and report to ER with any difficulty breathing, uncontrolled fever , or dehydration.    Advised may cont with OTC meds and should push fluids. Advised deep breathing and continuation of activity to help prevent pneumonia. Advised to call with any questions or concerns. Fever in other diseases  -     POCT COVID-19, Antigen  -     POCT Influenza A/B    COVID-19    Other orders  -     fluticasone (FLONASE) 50 MCG/ACT nasal spray; 2 sprays by Each Nostril route daily      Orders Placed This Encounter   Procedures    POCT COVID-19, Antigen     Order Specific Question:   Is this test for diagnosis or screening? Answer:   Screening     Order Specific Question:   Symptomatic for COVID-19 as defined by CDC? Answer:   No     Order Specific Question:   Date of Symptom Onset     Answer:   N/A     Order Specific Question:   Hospitalized for COVID-19? Answer:   No     Order Specific Question:   Admitted to ICU for COVID-19? Answer:   No     Order Specific Question:   Employed in healthcare setting? Answer:   Unknown     Order Specific Question:   Resident in a congregate (group) care setting? Answer:   Unknown     Order Specific Question:   Pregnant? Answer:   Unknown     Order Specific Question:   Previously tested for COVID-19? Answer: Yes    POCT Influenza A/B     Orders Placed This Encounter   Medications    fluticasone (FLONASE) 50 MCG/ACT nasal spray     Si sprays by Each Nostril route daily     Dispense:  16 g     Refill:  0     There are no discontinued medications. Return for worsening of condition. Reviewed with the patient/family: current clinical status & medications. Side effects of the medication prescribed today, as well as treatment plan/rationale and result expectations have been discussed with the patient/family who expresses understanding. Patient will be discharged home in stable condition. Follow up with PCP to evaluate treatment results or return if symptoms worsen or fail to improve.  Discussed signs and symptoms which require immediate follow-up in ED/call to 911.  Understanding verbalized. I have reviewed the patient's medical history in detail and updated the computerized patient record.     JIA Martinez - CNP

## 2022-01-17 ENCOUNTER — VIRTUAL VISIT (OUTPATIENT)
Dept: FAMILY MEDICINE CLINIC | Age: 32
End: 2022-01-17

## 2022-01-17 DIAGNOSIS — Z20.822 ENCOUNTER BY TELEHEALTH FOR SUSPECTED COVID-19: Primary | ICD-10-CM

## 2022-01-17 LAB
Lab: ABNORMAL
PERFORMING INSTRUMENT: ABNORMAL
QC PASS/FAIL: ABNORMAL
SARS-COV-2, POC: DETECTED

## 2022-01-17 PROCEDURE — 87426 SARSCOV CORONAVIRUS AG IA: CPT | Performed by: PHYSICIAN ASSISTANT

## 2022-01-17 PROCEDURE — 99213 OFFICE O/P EST LOW 20 MIN: CPT | Performed by: PHYSICIAN ASSISTANT

## 2022-01-17 ASSESSMENT — ENCOUNTER SYMPTOMS
CHEST TIGHTNESS: 0
NAUSEA: 0
COUGH: 0
VOMITING: 0
SHORTNESS OF BREATH: 0
SORE THROAT: 0
DIARRHEA: 0
BACK PAIN: 0
SINUS PAIN: 0
ABDOMINAL PAIN: 0
SINUS PRESSURE: 0

## 2022-01-17 NOTE — LETTER
[facility]  NOTIFICATION RETURN TO WORK / SCHOOL  [address] [facility] [address] [facility] [address]  Phone: [phone]  Fax: [phone]    January 17, 2022     Patient: Jaki Burris   Date of Visit: 1/17/2022       To Whom it May Concern:    Jaki Burris was tested for COVID-19 on 1/17/2022 and the result was positive. Patient's symptoms first started on January 12. Due to new CDC Guidelines, Patient will need to quarantine for 5 days from symptom onset. If the patient is asymptomatic or if symptoms are resolving and no fevers for 24 hours, without fever reducing medication such as Tylenol or Motrin, then patient is able to return to work on January 18 with strict mask policy for 5 additional days to minimize risk of infecting people they encounter. Otherwise, patient may return to work if fever free for 24 hours on on 1/23. Please refer to the [facility] website for further information or clarification. After this time, patient would not need to be retested for negative COVID-19 test as they can test positive for up to 3 months and not be considered contagious. If there are questions or concerns, please have the patient contact our office.       Sincerely,    Electronically signed by GEORGE Oleary on 1/17/2022 at 1:31 PM

## 2022-01-17 NOTE — PROGRESS NOTES
2022    TELEHEALTH EVALUATION -- Audio/Visual (During EWVEP-34 public health emergency)    Due to [place] 19 outbreak, patient's office visit was converted to a virtual visit. Patient was contacted and agreed to proceed with a virtual visit via [email or web address]  The risks and benefits of converting to a virtual visit were discussed in light of the current infectious disease epidemic. Patient also understood that insurance coverage and co-pays are up to their individual insurance plans. HPI:    Key Morataya (:  1990) has requested an audio/video evaluation for the following concern(s):    Patient tested positive for COVID-19 on 22. Patient need testing for negative. Symptoms have greatly improved. No fevers in the last 24 hours. Patient is eating and drinking. No SOB or chest pain. Review of Systems   Constitutional: Negative for activity change, appetite change, chills and fever. HENT: Negative for congestion, drooling, sinus pressure, sinus pain and sore throat. Eyes: Negative for visual disturbance. Respiratory: Negative for cough, chest tightness and shortness of breath. Cardiovascular: Negative for chest pain. Gastrointestinal: Negative for abdominal pain, diarrhea, nausea and vomiting. Endocrine: Negative for cold intolerance. Genitourinary: Negative for dysuria, flank pain, frequency and hematuria. Musculoskeletal: Negative for arthralgias and back pain. Skin: Negative for rash. Allergic/Immunologic: Negative for food allergies. Neurological: Negative for weakness, light-headedness, numbness and headaches. Hematological: Does not bruise/bleed easily. Prior to Visit Medications    Medication Sig Taking?  Authorizing Provider   fluticasone (FLONASE) 50 MCG/ACT nasal spray 2 sprays by Each Nostril route daily  JIA Bustillo - SMITHA   loratadine-pseudoephedrine (CLARITIN-D 12 HOUR) 5-120 MG per extended release tablet Take 1 tablet by mouth 2 times daily  Patient not taking: Reported on 1/14/2022  Urszula Holland PA-C       Social History     Tobacco Use    Smoking status: Never Smoker    Smokeless tobacco: Never Used   Vaping Use    Vaping Use: Never used   Substance Use Topics    Alcohol use: No    Drug use: No            PHYSICAL EXAMINATION:  [ INSTRUCTIONS:  \"[x]\" Indicates a positive item  \"[]\" Indicates a negative item  -- DELETE ALL ITEMS NOT EXAMINED]  [x] Alert  [x] Oriented to person/place/time    [x] No apparent distress  [] Toxic appearing    [] Face flushed appearing [] Sclera clear  [] Lips are cyanotic      [x] Breathing appears normal  [] Appears tachypneic      [] Rash on visible skin    [x] Cranial Nerves II-XII grossly intact    [x] Motor grossly intact in visible upper extremities    [x] Motor grossly intact in visible lower extremities    [x] Normal Mood  [] Anxious appearing    [] Depressed appearing  [] Confused appearing      [] Poor short term memory  [] Poor long term memory    [] OTHER:      Due to this being a TeleHealth encounter, evaluation of the following organ systems is limited: Vitals/Constitutional/EENT/Resp/CV/GI//MS/Neuro/Skin/Heme-Lymph-Imm. ASSESSMENT/PLAN:  1. Encounter by telehealth for suspected COVID-19  - Positive today. Improving at this time. No fevers. May return tomorrow, otherwise on 1/23/22.   - Discussed signs and symptoms which require immediate follow-up in ED/call to 911. Patient verbalized understanding.  - POCT COVID-19, Antigen      Return if symptoms worsen or fail to improve. An  electronic signature was used to authenticate this note.     --GEORGE Castillo on 1/17/2022 at 1:37 PM        Pursuant to the emergency declaration under the [facility], [address] [place] [address] authority and the [employer] Act, this Virtual  Visit was conducted, with patient's consent, to reduce the patient's risk of exposure to COVID-19 and provide continuity of care for an established patient. Services were provided through a video synchronous discussion virtually to substitute for in-person clinic visit.

## 2022-01-22 ENCOUNTER — OFFICE VISIT (OUTPATIENT)
Dept: FAMILY MEDICINE CLINIC | Age: 32
End: 2022-01-22

## 2022-01-22 VITALS
HEIGHT: 64 IN | BODY MASS INDEX: 23.05 KG/M2 | SYSTOLIC BLOOD PRESSURE: 120 MMHG | OXYGEN SATURATION: 99 % | DIASTOLIC BLOOD PRESSURE: 80 MMHG | WEIGHT: 135 LBS | TEMPERATURE: 95.5 F | HEART RATE: 67 BPM

## 2022-01-22 DIAGNOSIS — U07.1 COVID-19: Primary | ICD-10-CM

## 2022-01-22 PROCEDURE — 99213 OFFICE O/P EST LOW 20 MIN: CPT

## 2022-01-22 ASSESSMENT — ENCOUNTER SYMPTOMS
COUGH: 0
GASTROINTESTINAL NEGATIVE: 1
EYES NEGATIVE: 1
ALLERGIC/IMMUNOLOGIC NEGATIVE: 1
SHORTNESS OF BREATH: 0
RESPIRATORY NEGATIVE: 1

## 2022-01-22 NOTE — PROGRESS NOTES
[address] Encounter  CHIEF COMPLAINT       Chief Complaint   Patient presents with    Otalgia     left ear     Covid Testing     PCR test needed       HISTORY OF PRESENT ILLNESS   Calos Gottlieb is a 32 y.o. female who presents with:  HPI  Patient requesting testing for COVID-19 so that she may return to work. She tested positive on January 17 and she is currently asymptomatic  REVIEW OF SYSTEMS     Review of Systems   Constitutional: Negative. Negative for fatigue and fever. HENT: Negative. Negative for congestion. Eyes: Negative. Respiratory: Negative. Negative for cough and shortness of breath. Cardiovascular: Negative. Gastrointestinal: Negative. Endocrine: Negative. Genitourinary: Negative. Musculoskeletal: Negative. Skin: Negative. Allergic/Immunologic: Negative. Neurological: Negative. Hematological: Negative. Psychiatric/Behavioral: Negative. PAST MEDICAL HISTORY         Diagnosis Date    Heart murmur     childhood    Hernia of abdominal wall     Umbilical hernia 2/7236     SURGICAL HISTORY     Patient  has a past surgical history that includes Tubal ligation; Umbilical hernia repair (N/A, 12/2/2016); hernia repair; and pr esophagogastroduodenoscopy transoral diagnostic (N/A, 8/31/2018). CURRENT MEDICATIONS       Previous Medications    FLUTICASONE (FLONASE) 50 MCG/ACT NASAL SPRAY    2 sprays by Each Nostril route daily    LORATADINE-PSEUDOEPHEDRINE (CLARITIN-D 12 HOUR) 5-120 MG PER EXTENDED RELEASE TABLET    Take 1 tablet by mouth 2 times daily     ALLERGIES     Patient is has No Known Allergies. FAMILY HISTORY     Patient'sfamily history includes No Known Problems in her daughter, daughter, sister, son, and son; Other in her father and mother. HISTORY     Patient  reports that she has never smoked.  She has never used smokeless tobacco. She reports that she does not drink alcohol and does not use drugs.  PHYSICAL EXAM     VITALS  BP: 120/80, Temp: 95.5 °F (35.3 °C), Pulse: 67,  , SpO2: 99 %  Physical Exam  Constitutional:       General: She is not in acute distress. Appearance: Normal appearance. She is not ill-appearing, toxic-appearing or diaphoretic. HENT:      Head: Normocephalic. Right Ear: Tympanic membrane, ear canal and external ear normal. No middle ear effusion. There is no impacted cerumen. No mastoid tenderness. Tympanic membrane is not perforated, erythematous or bulging. Left Ear: Tympanic membrane, ear canal and external ear normal.  No middle ear effusion. There is no impacted cerumen. No mastoid tenderness. Tympanic membrane is not perforated, erythematous or bulging. Nose: No congestion or rhinorrhea. Mouth/Throat:      Mouth: Mucous membranes are moist.      Pharynx: Oropharynx is clear. Eyes:      General:         Right eye: No discharge. Left eye: No discharge. Cardiovascular:      Rate and Rhythm: Normal rate and regular rhythm. Pulses: Normal pulses. Heart sounds: Normal heart sounds. No murmur heard. No gallop. Pulmonary:      Effort: Pulmonary effort is normal. No respiratory distress. Breath sounds: Normal breath sounds. No stridor. No wheezing, rhonchi or rales. Chest:      Chest wall: No tenderness. Abdominal:      General: Abdomen is flat. There is no distension. Palpations: Abdomen is soft. Musculoskeletal:         General: Normal range of motion. Cervical back: No rigidity or tenderness. Lymphadenopathy:      Cervical: No cervical adenopathy. Skin:     General: Skin is warm and dry. Capillary Refill: Capillary refill takes less than 2 seconds. Coloration: Skin is not pale. Neurological:      General: No focal deficit present. Mental Status: She is alert and oriented to person, place, and time. Mental status is at baseline.    Psychiatric:         Mood and Affect: Mood normal. Behavior: Behavior normal.       READY CARE COURSE     Orders Placed This Encounter   Procedures    Covid-19 Ambulatory     Standing Status:   Future     Standing Expiration Date:   1/22/2023     Scheduling Instructions:      Saline media preferred given current shortage of viral transport media but both acceptable     Order Specific Question:   Is this test for diagnosis or screening? Answer:   Screening     Order Specific Question:   Symptomatic for COVID-19 as defined by CDC? Answer:   No     Order Specific Question:   Date of Symptom Onset     Answer:   N/A     Order Specific Question:   Hospitalized for COVID-19? Answer:   No     Order Specific Question:   Admitted to ICU for COVID-19? Answer:   No     Order Specific Question:   Employed in healthcare setting? Answer:   No     Order Specific Question:   Resident in a congregate (group) care setting? Answer:   No     Order Specific Question:   Pregnant? Answer:   No     Order Specific Question:   Previously tested for COVID-19? Answer:   Yes        Labs:  No results found for this visit on 01/22/22. IMAGING:  No orders to display     Scheduled Meds:  Continuous Infusions:  PRN Meds:. PROCEDURES:  FINAL IMPRESSION      1. COVID-19        DISPOSITION/PLAN     HISTORY OF PRESENT ILLNESS   Britney Ramos is a 32 y.o. female who presents with asymptomatic patient who tested positive for COVID-19 on January 17 requesting PCR testing so that she may return to work. Pt is afebrile has nontoxic appearance and VS are stable. On examination ears are bilaterally normal.  Neck is supple no masses. Lung sounds are clear throughout. Heart sounds normal and regular. Ambulatory COVID order placed. Will call patient with results. PATIENT REFERRED TO:  Return if symptoms worsen or fail to improve, for Follow up with PCP.     DISCHARGE MEDICATIONS:  New Prescriptions    No medications on file     Cannot display discharge medications since this is not an admission.        Rafa Calloway, APRN - CNP

## 2022-01-22 NOTE — PATIENT INSTRUCTIONS
Patient Education        COVID-19 Viral Test: About This Test  What is it? A COVID-19 viral test is a way to find out if you have COVID-19. The test looks for the virus in your breathing passages. There are different types of viral tests. One type looks for genetic material from the virus. This is usually called polymerase chain reaction (PCR). Another type looks for proteins on the virus. This is usually called an antigen test. It may not be as accurate as PCR. Some test results come back in a few minutes. Others may take a few days. Why is it done? This test is used to diagnose a current infection with SARS-CoV-2, the virus that causes COVID-19. Knowing that you have the virus means that you can take steps to protect others from getting infected. This can help limit the spread of the virus. Knowing who has COVID-19 is also important for experts who track the virus. How do you prepare for the test?  You don't need to do anything to prepare for this test. But be sure to follow any instructions your health care provider gives you. How is it done? The test is most often done on a sample from your nose or throat. It's sometimes done on a sample of saliva. One way a sample is collected is by putting a long swab into the back of your nose. Samples can be tested in different ways to look for an infection. What should you do while you wait for your test results? If you are being tested because you aren't fully vaccinated and you've been exposed to COVID-19 or have been sick from COVID-19, you will want to know what to do while you wait for your test results. While you wait for the results of your COVID-19 test, stay in the place where you live, and stay away from others. Do this even if you don't feel sick or have any symptoms. Don't leave unless you need medical care. If you can, try to stay in a separate room. This might help you avoid infecting family members or other people you live with.   Follow your doctor's instructions about what to do when you get your results back. Be sure to wear a mask and follow social-distancing guidelines after you get your results, even if the test is negative. If you're fully vaccinated and were exposed to COVID-19, wear a mask in public indoor spaces until you test negative for COVID-19. What do your results mean? The result is either positive or negative. A positive result means that the antigen or the genetic material of the virus was found in your sample. You have COVID-19 now. A negative result means that the antigen or the genetic material was not found. This may mean that you don't have COVID-19. But it's possible to get a \"false-negative\" result. This means that the test shows that you don't have COVID-19 when in fact you do. This may happen because you were tested too soon after you were infected, before the virus started to spread in your nose and throat. Or it could happen because the swab missed the infection. If you get a negative result for an antigen test, your doctor may recommend that you get another test, such as polymerase chain reaction (PCR), to make sure you don't have the virus. In general, PCR is more accurate than an antigen test.  Some test results come back in a few minutes. Others may take a few days. If your test is negative, follow your doctor's advice for when you can go back to activities. If your test is positive, talk to your doctor or a public health official about what you need to do. Where can you learn more? Go to [email or web address].Askablogr. org and sign in to your [employer] account. Enter A129 in the HubSpot box to learn more about \"COVID-19 Viral Test: About This Test.\"     If you do not have an account, please click on the \"Sign Up Now\" link. Current as of: March 26, 2021               Content Version: 13.1  © 9476-2675 Healthwise, Incorporated. Care instructions adapted under license by [facility] ([facility]).  If you have questions about a medical condition or this instruction, always ask your healthcare professional. [place] [address] any warranty or liability for your use of this information.

## 2022-01-23 DIAGNOSIS — U07.1 COVID-19: ICD-10-CM

## 2022-01-27 LAB
SARS-COV-2: NOT DETECTED
SOURCE: NORMAL

## 2022-03-15 RX ORDER — FLUTICASONE PROPIONATE 50 MCG
2 SPRAY, SUSPENSION (ML) NASAL DAILY
Qty: 16 G | Refills: 0 | OUTPATIENT
Start: 2022-03-15

## 2022-03-15 RX ORDER — FLUTICASONE PROPIONATE 50 MCG
SPRAY, SUSPENSION (ML) NASAL
Qty: 16 G | Refills: 0 | OUTPATIENT
Start: 2022-03-15

## 2022-03-15 NOTE — TELEPHONE ENCOUNTER
Patient requesting medication refill. Please approve or deny this request.    Rx requested:  Requested Prescriptions     Pending Prescriptions Disp Refills    fluticasone (FLONASE) 50 MCG/ACT nasal spray 16 g 0     Si sprays by Each Nostril route daily         Last Office Visit:   2021      Next Visit Date:  No future appointments.

## 2022-11-02 ENCOUNTER — OFFICE VISIT (OUTPATIENT)
Dept: OBGYN CLINIC | Age: 32
End: 2022-11-02

## 2022-11-02 VITALS
WEIGHT: 145 LBS | HEIGHT: 63 IN | SYSTOLIC BLOOD PRESSURE: 108 MMHG | DIASTOLIC BLOOD PRESSURE: 62 MMHG | BODY MASS INDEX: 25.69 KG/M2

## 2022-11-02 DIAGNOSIS — N89.8 VAGINAL DISCHARGE: ICD-10-CM

## 2022-11-02 DIAGNOSIS — Z11.51 SCREENING FOR HUMAN PAPILLOMAVIRUS: ICD-10-CM

## 2022-11-02 DIAGNOSIS — Z01.419 ENCOUNTER FOR WELL WOMAN EXAM WITH ROUTINE GYNECOLOGICAL EXAM: Primary | ICD-10-CM

## 2022-11-02 DIAGNOSIS — R10.2 PELVIC PAIN IN FEMALE: ICD-10-CM

## 2022-11-02 PROCEDURE — 99385 PREV VISIT NEW AGE 18-39: CPT | Performed by: OBSTETRICS & GYNECOLOGY

## 2022-11-02 ASSESSMENT — ENCOUNTER SYMPTOMS
NAUSEA: 0
CONSTIPATION: 0
ABDOMINAL DISTENTION: 0
ABDOMINAL PAIN: 0
EYES NEGATIVE: 1
RESPIRATORY NEGATIVE: 1
ALLERGIC/IMMUNOLOGIC NEGATIVE: 1
DIARRHEA: 0
RECTAL PAIN: 0
BLOOD IN STOOL: 0
VOMITING: 0
ANAL BLEEDING: 0

## 2022-11-02 ASSESSMENT — VISUAL ACUITY: OU: 1

## 2022-11-02 NOTE — PROGRESS NOTES
Subjective:      Patient ID: Frederic Sawyer is a 28 y.o. female    Annual exam. Reviewed medical, surgical, social and family history. Also reviewed current medications and allergies. New patient; reviewed medical, surgical, social and family history. Also reviewed current medications and allergies. Some increased discharge; no odor, itching or burning.  and monogamous. Declines STD screen. Wet prep sent. Normal cycles. Pap deferred ( negative co-testing 2021; next pap 2026 ). STD screening offered. Monthly SBE encouraged. F/U annual or prn. Vitals:  /62   Ht 5' 3\" (1.6 m)   Wt 145 lb (65.8 kg)   LMP 10/13/2022 (Approximate)   BMI 25.69 kg/m²   Past Medical History:   Diagnosis Date    Heart murmur     childhood    Hernia of abdominal wall     Umbilical hernia 87/9339     Past Surgical History:   Procedure Laterality Date    HERNIA REPAIR      [place] ESOPHAGOGASTRODUODENOSCOPY TRANSORAL DIAGNOSTIC N/A 8/31/2018    EGD ESOPHAGOGASTRODUODENOSCOPY performed by Cheryl Bates MD at [facility] N/A 12/2/2016    4.3 cm Ventralex mesh     Allergies:  Patient has no known allergies. No current outpatient medications on file. No current facility-administered medications for this visit.      Social History     Socioeconomic History    Marital status:      Spouse name: Not on file    Number of children: Not on file    Years of education: Not on file    Highest education level: Not on file   Occupational History    Not on file   Tobacco Use    Smoking status: Never    Smokeless tobacco: Never   Vaping Use    Vaping Use: Never used   Substance and Sexual Activity    Alcohol use: No    Drug use: No    Sexual activity: Yes     Partners: Male   Other Topics Concern    Not on file   Social History Narrative    Not on file     Social Determinants of Health     Financial Resource Strain: Not on file   Food Insecurity: Not on file Transportation Needs: Not on file   Physical Activity: Not on file   Stress: Not on file   Social Connections: Not on file   Intimate Partner Violence: Not on file   Housing Stability: Not on file     Family History   Problem Relation Age of Onset    Other Mother         fibromylagia    Other Father     No Known Problems Sister     No Known Problems Son     No Known Problems Daughter     No Known Problems Son     No Known Problems Daughter        Review of Systems   Constitutional: Negative. Negative for activity change, appetite change, chills, diaphoresis, fatigue, fever and unexpected weight change. HENT: Negative. Eyes: Negative. Respiratory: Negative. Cardiovascular: Negative. Gastrointestinal:  Negative for abdominal distention, abdominal pain, anal bleeding, blood in stool, constipation, diarrhea, nausea, rectal pain and vomiting. Endocrine: Negative. Genitourinary:  Positive for vaginal discharge. Negative for decreased urine volume, difficulty urinating, dyspareunia, dysuria, enuresis, flank pain, frequency, genital sores, hematuria, menstrual problem, pelvic pain, urgency, vaginal bleeding and vaginal pain. Musculoskeletal: Negative. Skin: Negative. Allergic/Immunologic: Negative. Neurological: Negative. Hematological: Negative. Psychiatric/Behavioral: Negative. Objective:     Physical Exam  Constitutional:       Appearance: She is well-developed. HENT:      Head: Normocephalic. Eyes:      General: Lids are normal. Vision grossly intact. Neck:      Thyroid: No thyromegaly. Cardiovascular:      Rate and Rhythm: Normal rate and regular rhythm. Heart sounds: Normal heart sounds. Pulmonary:      Effort: Pulmonary effort is normal. No respiratory distress. Breath sounds: Normal breath sounds. No wheezing or rales. Chest:      Chest wall: No tenderness.    Breasts:     Right: Normal. No swelling, bleeding, inverted nipple, mass, nipple discharge, skin change or tenderness. Left: Normal. No swelling, bleeding, inverted nipple, mass, nipple discharge, skin change or tenderness. Abdominal:      General: There is no distension. Palpations: Abdomen is soft. There is no mass. Tenderness: There is no abdominal tenderness. There is no guarding or rebound. Hernia: No hernia is present. There is no hernia in the left inguinal area or right inguinal area. Genitourinary:     General: Normal vulva. Pubic Area: No rash. Labia:         Right: No rash, tenderness, lesion or injury. Left: No rash, tenderness, lesion or injury. Urethra: No prolapse, urethral swelling or urethral lesion. Vagina: Normal. No signs of injury and foreign body. No vaginal discharge, erythema, tenderness or bleeding. Cervix: No cervical motion tenderness, discharge or friability. Uterus: Not deviated, not enlarged, not fixed and not tender. Adnexa:         Right: No mass, tenderness or fullness. Left: No mass, tenderness or fullness. Rectum: Normal.   Musculoskeletal:         General: No tenderness. Normal range of motion. Cervical back: Normal range of motion and neck supple. Lymphadenopathy:      Cervical: No cervical adenopathy. Upper Body:      Right upper body: No supraclavicular or axillary adenopathy. Left upper body: No supraclavicular or axillary adenopathy. Lower Body: No right inguinal adenopathy. No left inguinal adenopathy. Skin:     General: Skin is warm and dry. Coloration: Skin is not pale. Findings: No erythema or rash. Neurological:      Mental Status: She is alert and oriented to person, place, and time. Psychiatric:         Behavior: Behavior normal.         Thought Content: Thought content normal.         Judgment: Judgment normal.       Assessment:       Diagnosis Orders   1.  Encounter for well woman exam with routine gynecological exam 2. Screening for human papillomavirus        3. Vaginal discharge  Wet prep, genital      4. Pelvic pain in female  Wet prep, genital           Plan:      Medications placedthis encounter:  No orders of the defined types were placed in this encounter. Orders placedthis encounter:  Orders Placed This Encounter   Procedures    Wet prep, genital     Standing Status:   Future     Standing Expiration Date:   11/2/2023           Follow up:  Return in about 1 year (around 11/2/2023) for Annual.  Repeat Annual GYN exam every 1 year. Cervical Cytology exam starts age 24. If Negative Cytology;  follow-up screening per current guidelines. Mammograms yearly starting at age 36. Calcium and Vitamin D dosing reviewed ( age appropriate ). Colonoscopy and bone density screening discussed ( age appropriate ). Birth control and STD prevention discussed ( age appropriate ). Gardisil counseling completed for all patients ( age appropriate ). Routine health maintenance ( per PCP and guidelines ).

## 2022-11-09 DIAGNOSIS — R10.2 PELVIC PAIN IN FEMALE: ICD-10-CM

## 2022-11-09 DIAGNOSIS — N89.8 VAGINAL DISCHARGE: ICD-10-CM

## 2022-11-11 ENCOUNTER — TELEPHONE (OUTPATIENT)
Dept: OBGYN CLINIC | Age: 32
End: 2022-11-11

## 2022-11-11 RX ORDER — FLUCONAZOLE 150 MG/1
150 TABLET ORAL ONCE
Qty: 1 TABLET | Refills: 2 | Status: SHIPPED | OUTPATIENT
Start: 2022-11-11 | End: 2022-11-11

## 2022-11-11 NOTE — TELEPHONE ENCOUNTER
----- Message from Vickie Olsen MD sent at 11/10/2022  8:47 AM EST -----  + yeast.  Diflucan x 1 ( 2 refills ).

## 2023-05-27 ENCOUNTER — OFFICE VISIT (OUTPATIENT)
Dept: FAMILY MEDICINE CLINIC | Age: 33
End: 2023-05-27

## 2023-05-27 VITALS
HEART RATE: 86 BPM | SYSTOLIC BLOOD PRESSURE: 122 MMHG | TEMPERATURE: 97.7 F | OXYGEN SATURATION: 96 % | BODY MASS INDEX: 24.98 KG/M2 | HEIGHT: 63 IN | WEIGHT: 141 LBS | DIASTOLIC BLOOD PRESSURE: 80 MMHG

## 2023-05-27 DIAGNOSIS — B96.89 ACUTE BACTERIAL SINUSITIS: Primary | ICD-10-CM

## 2023-05-27 DIAGNOSIS — J01.90 ACUTE BACTERIAL SINUSITIS: Primary | ICD-10-CM

## 2023-05-27 PROCEDURE — 99213 OFFICE O/P EST LOW 20 MIN: CPT | Performed by: NURSE PRACTITIONER

## 2023-05-27 RX ORDER — AMOXICILLIN 875 MG/1
875 TABLET, COATED ORAL 2 TIMES DAILY
Qty: 20 TABLET | Refills: 0 | Status: SHIPPED | OUTPATIENT
Start: 2023-05-27 | End: 2023-06-06

## 2023-05-27 SDOH — ECONOMIC STABILITY: HOUSING INSECURITY
IN THE LAST 12 MONTHS, WAS THERE A TIME WHEN YOU DID NOT HAVE A STEADY PLACE TO SLEEP OR SLEPT IN A SHELTER (INCLUDING NOW)?: NO

## 2023-05-27 SDOH — ECONOMIC STABILITY: INCOME INSECURITY: HOW HARD IS IT FOR YOU TO PAY FOR THE VERY BASICS LIKE FOOD, HOUSING, MEDICAL CARE, AND HEATING?: NOT HARD AT ALL

## 2023-05-27 SDOH — ECONOMIC STABILITY: FOOD INSECURITY: WITHIN THE PAST 12 MONTHS, THE FOOD YOU BOUGHT JUST DIDN'T LAST AND YOU DIDN'T HAVE MONEY TO GET MORE.: NEVER TRUE

## 2023-05-27 SDOH — ECONOMIC STABILITY: FOOD INSECURITY: WITHIN THE PAST 12 MONTHS, YOU WORRIED THAT YOUR FOOD WOULD RUN OUT BEFORE YOU GOT MONEY TO BUY MORE.: NEVER TRUE

## 2023-05-27 ASSESSMENT — PATIENT HEALTH QUESTIONNAIRE - PHQ9
SUM OF ALL RESPONSES TO PHQ QUESTIONS 1-9: 0
SUM OF ALL RESPONSES TO PHQ QUESTIONS 1-9: 0
SUM OF ALL RESPONSES TO PHQ9 QUESTIONS 1 & 2: 0
SUM OF ALL RESPONSES TO PHQ QUESTIONS 1-9: 0
2. FEELING DOWN, DEPRESSED OR HOPELESS: 0
SUM OF ALL RESPONSES TO PHQ QUESTIONS 1-9: 0
1. LITTLE INTEREST OR PLEASURE IN DOING THINGS: 0

## 2023-05-27 ASSESSMENT — ENCOUNTER SYMPTOMS
SORE THROAT: 1
EYE PAIN: 0
SINUS PAIN: 1
EYE DISCHARGE: 0
SINUS PRESSURE: 1
CHEST TIGHTNESS: 0
VOICE CHANGE: 0
CONSTIPATION: 0
EYE ITCHING: 0
WHEEZING: 0
DIARRHEA: 0
VOMITING: 0
TROUBLE SWALLOWING: 0
RHINORRHEA: 1
NAUSEA: 0
PHOTOPHOBIA: 0
COUGH: 1
ABDOMINAL PAIN: 0
EYE REDNESS: 0
SHORTNESS OF BREATH: 0

## 2023-05-27 ASSESSMENT — VISUAL ACUITY: OU: 1

## 2023-05-27 NOTE — PROGRESS NOTES
Subjective:      Patient ID: Leydi Lutz is a 28 y.o. female who present today with:      Chief Complaint   Patient presents with    Pharyngitis     At night some sinus issues sx started a week ago.      Symptoms for the last week  Horrible sore throat for the last week  Loss of voice  Productive cough  Vicks cough drops  No GI symptoms   Hurts to take a deep breath  No fever, chills  Body aches  No wheezing, SOB  Headache, sinus pressure  Purulent mucous    Past Medical History:   Diagnosis Date    Heart murmur     childhood    Hernia of abdominal wall     Umbilical hernia 35/3277     Patient Active Problem List    Diagnosis Date Noted    Superior labrum anterior-to-posterior (SLAP) tear of right shoulder 09/18/2020    Rotator cuff tendonitis, right 09/18/2020    Gastritis without bleeding     Panic attack as reaction to stress 09/15/2017    Irritable bowel syndrome with diarrhea 09/15/2017    Seasonal allergies 09/15/2017    Vitamin D deficiency 09/15/2017    Iron deficiency anemia 09/15/2017     Past Surgical History:   Procedure Laterality Date    HERNIA REPAIR      [place] ESOPHAGOGASTRODUODENOSCOPY TRANSORAL DIAGNOSTIC N/A 8/31/2018    EGD ESOPHAGOGASTRODUODENOSCOPY performed by Moe Carrillo MD at [address] [employer] N/A 12/2/2016    4.3 cm Ventralex mesh     Social History     Socioeconomic History    Marital status:      Spouse name: None    Number of children: None    Years of education: None    Highest education level: None   Tobacco Use    Smoking status: Never    Smokeless tobacco: Never   Vaping Use    Vaping Use: Never used   Substance and Sexual Activity    Alcohol use: No    Drug use: No    Sexual activity: Yes     Partners: Male     Social Determinants of Health     Financial Resource Strain: Low Risk     Difficulty of Paying Living Expenses: Not hard at all   Food Insecurity: No Food Insecurity    Worried About Running Out of Food in the

## 2023-09-22 ENCOUNTER — OFFICE VISIT (OUTPATIENT)
Dept: FAMILY MEDICINE CLINIC | Age: 33
End: 2023-09-22

## 2023-09-22 VITALS
OXYGEN SATURATION: 100 % | BODY MASS INDEX: 25.78 KG/M2 | TEMPERATURE: 97.5 F | HEART RATE: 75 BPM | HEIGHT: 64 IN | WEIGHT: 151 LBS | SYSTOLIC BLOOD PRESSURE: 104 MMHG | DIASTOLIC BLOOD PRESSURE: 76 MMHG

## 2023-09-22 DIAGNOSIS — B37.9 ANTIBIOTIC-INDUCED YEAST INFECTION: ICD-10-CM

## 2023-09-22 DIAGNOSIS — R39.9 UTI SYMPTOMS: ICD-10-CM

## 2023-09-22 DIAGNOSIS — R39.9 UTI SYMPTOMS: Primary | ICD-10-CM

## 2023-09-22 DIAGNOSIS — T36.95XA ANTIBIOTIC-INDUCED YEAST INFECTION: ICD-10-CM

## 2023-09-22 LAB
BILIRUBIN, POC: ABNORMAL
BLOOD URINE, POC: ABNORMAL
CLARITY, POC: ABNORMAL
COLOR, POC: ABNORMAL
GLUCOSE URINE, POC: ABNORMAL
KETONES, POC: ABNORMAL
LEUKOCYTE EST, POC: ABNORMAL
NITRITE, POC: ABNORMAL
PH, POC: 7
PROTEIN, POC: ABNORMAL
SPECIFIC GRAVITY, POC: 1.01
UROBILINOGEN, POC: ABNORMAL

## 2023-09-22 RX ORDER — FLUCONAZOLE 150 MG/1
150 TABLET ORAL ONCE
Qty: 1 TABLET | Refills: 0 | Status: SHIPPED | OUTPATIENT
Start: 2023-09-22 | End: 2023-09-22

## 2023-09-22 RX ORDER — NITROFURANTOIN 25; 75 MG/1; MG/1
100 CAPSULE ORAL 2 TIMES DAILY
Qty: 14 CAPSULE | Refills: 0 | Status: SHIPPED | OUTPATIENT
Start: 2023-09-22 | End: 2023-09-29

## 2023-09-22 ASSESSMENT — ENCOUNTER SYMPTOMS
SHORTNESS OF BREATH: 0
BACK PAIN: 1
VOMITING: 0
RHINORRHEA: 0
SORE THROAT: 0
WHEEZING: 0
DIARRHEA: 0
NAUSEA: 0
COUGH: 0

## 2023-09-24 LAB — BACTERIA UR CULT: NORMAL

## 2023-11-13 ENCOUNTER — OFFICE VISIT (OUTPATIENT)
Dept: FAMILY MEDICINE CLINIC | Age: 33
End: 2023-11-13

## 2023-11-13 VITALS
OXYGEN SATURATION: 99 % | WEIGHT: 151.01 LBS | TEMPERATURE: 97.4 F | RESPIRATION RATE: 12 BRPM | BODY MASS INDEX: 25.78 KG/M2 | SYSTOLIC BLOOD PRESSURE: 106 MMHG | HEIGHT: 64 IN | DIASTOLIC BLOOD PRESSURE: 70 MMHG | HEART RATE: 60 BPM

## 2023-11-13 DIAGNOSIS — H10.31 ACUTE BACTERIAL CONJUNCTIVITIS OF RIGHT EYE: Primary | ICD-10-CM

## 2023-11-13 PROCEDURE — 99213 OFFICE O/P EST LOW 20 MIN: CPT | Performed by: NURSE PRACTITIONER

## 2023-11-13 RX ORDER — POLYMYXIN B SULFATE AND TRIMETHOPRIM 1; 10000 MG/ML; [USP'U]/ML
1 SOLUTION OPHTHALMIC EVERY 6 HOURS
Qty: 1 EACH | Refills: 0 | Status: SHIPPED | OUTPATIENT
Start: 2023-11-13 | End: 2023-11-20

## 2023-11-13 ASSESSMENT — ENCOUNTER SYMPTOMS
STRIDOR: 0
SORE THROAT: 0
DIARRHEA: 0
ABDOMINAL PAIN: 0
VOMITING: 0
EYE PAIN: 1
EYE REDNESS: 1
TROUBLE SWALLOWING: 0
RHINORRHEA: 0
PHOTOPHOBIA: 0
SINUS PRESSURE: 0
EYE DISCHARGE: 1
SINUS PAIN: 0
COUGH: 0
CHEST TIGHTNESS: 0
NAUSEA: 0
SHORTNESS OF BREATH: 0
VOICE CHANGE: 0
EYE ITCHING: 1
WHEEZING: 0

## 2023-11-13 NOTE — PROGRESS NOTES
Subjective:      Patient ID: Sarahy Alcantar is a 35 y.o. female who presents today for:  Chief Complaint   Patient presents with    Conjunctivitis     Patient present today with possible pink eye to her right eye since yesterday with her eye crusty with little itch. HPI  Patient has pink discoloration to the right eye with drainage, crusting, and itching. Says it started yesterday. Says she has no URI, fever, or chills. Says vision is normal.   Says she has no pain to eye. Says she has no used anything. Says she has dry eye. Says she does not use contact.    Past Medical History:   Diagnosis Date    Heart murmur     childhood    Hernia of abdominal wall     Umbilical hernia 08/4780     Past Surgical History:   Procedure Laterality Date    HERNIA REPAIR      [place] ESOPHAGOGASTRODUODENOSCOPY TRANSORAL DIAGNOSTIC N/A 8/31/2018    EGD ESOPHAGOGASTRODUODENOSCOPY performed by Shivani Pope MD at [facility] N/A 12/2/2016    4.3 cm Ventralex mesh     Social History     Socioeconomic History    Marital status:      Spouse name: Not on file    Number of children: Not on file    Years of education: Not on file    Highest education level: Not on file   Occupational History    Not on file   Tobacco Use    Smoking status: Never    Smokeless tobacco: Never   Vaping Use    Vaping Use: Never used   Substance and Sexual Activity    Alcohol use: No    Drug use: No    Sexual activity: Yes     Partners: Male   Other Topics Concern    Not on file   Social History Narrative    Not on file     Social Determinants of Health     Financial Resource Strain: Low Risk  (5/27/2023)    Overall Financial Resource Strain (CARDIA)     Difficulty of Paying Living Expenses: Not hard at all   Food Insecurity: No Food Insecurity (5/27/2023)    Hunger Vital Sign     Worried About Running Out of Food in the Last Year: Never true     [address] in the Last Year: Never

## 2024-04-08 ENCOUNTER — OFFICE VISIT (OUTPATIENT)
Dept: FAMILY MEDICINE CLINIC | Age: 34
End: 2024-04-08

## 2024-04-08 VITALS
DIASTOLIC BLOOD PRESSURE: 76 MMHG | WEIGHT: 146 LBS | BODY MASS INDEX: 24.92 KG/M2 | TEMPERATURE: 97.6 F | SYSTOLIC BLOOD PRESSURE: 110 MMHG | HEIGHT: 64 IN | HEART RATE: 72 BPM | OXYGEN SATURATION: 98 %

## 2024-04-08 DIAGNOSIS — Z13.220 LIPID SCREENING: ICD-10-CM

## 2024-04-08 DIAGNOSIS — D50.9 IRON DEFICIENCY ANEMIA, UNSPECIFIED IRON DEFICIENCY ANEMIA TYPE: ICD-10-CM

## 2024-04-08 DIAGNOSIS — L91.0 KELOID SCAR OF SKIN: Primary | ICD-10-CM

## 2024-04-08 PROCEDURE — 99213 OFFICE O/P EST LOW 20 MIN: CPT | Performed by: PHYSICIAN ASSISTANT

## 2024-04-08 ASSESSMENT — PATIENT HEALTH QUESTIONNAIRE - PHQ9
SUM OF ALL RESPONSES TO PHQ QUESTIONS 1-9: 0
1. LITTLE INTEREST OR PLEASURE IN DOING THINGS: NOT AT ALL
2. FEELING DOWN, DEPRESSED OR HOPELESS: NOT AT ALL
2. FEELING DOWN, DEPRESSED OR HOPELESS: NOT AT ALL
SUM OF ALL RESPONSES TO PHQ9 QUESTIONS 1 & 2: 0
1. LITTLE INTEREST OR PLEASURE IN DOING THINGS: NOT AT ALL
SUM OF ALL RESPONSES TO PHQ9 QUESTIONS 1 & 2: 0
SUM OF ALL RESPONSES TO PHQ QUESTIONS 1-9: 0

## 2024-04-08 NOTE — PROGRESS NOTES
Subjective  Parvin Willams, 33 y.o. female presents today with:  Chief Complaint   Patient presents with    H&P     Patient presents today for a physical. Patient would like a referral for dermatologist.        HPI  Has a bump on the lower lip after a spider bite approx 2 yrs. Getting bigger and tender  Requesting referral for excision   Right shoulder pain nearly resolved w.o surgical intervention has from - doing strengthening exercises at the gym  Heartburn sxs resolved  Has been seeking care thru [facility] and dentistry d.t lack of ins  H/o anemia - taking mvi with iron on occassion  Review of Systems   Genitourinary:         Lmp 3/26         Past Medical History:   Diagnosis Date    Heart murmur     childhood    Hernia of abdominal wall     Umbilical hernia 06/2014     Past Surgical History:   Procedure Laterality Date    HERNIA REPAIR      [place] ESOPHAGOGASTRODUODENOSCOPY TRANSORAL DIAGNOSTIC N/A 8/31/2018    EGD ESOPHAGOGASTRODUODENOSCOPY performed by Ezra Boykin MD at [facility]    TUBAL LIGATION      UMBILICAL HERNIA REPAIR N/A 12/2/2016    4.3 cm Ventralex mesh     Social History     Socioeconomic History    Marital status:      Spouse name: Not on file    Number of children: Not on file    Years of education: Not on file    Highest education level: Not on file   Occupational History    Not on file   Tobacco Use    Smoking status: Never    Smokeless tobacco: Never   Vaping Use    Vaping Use: Never used   Substance and Sexual Activity    Alcohol use: No    Drug use: No    Sexual activity: Yes     Partners: Male   Other Topics Concern    Not on file   Social History Narrative    Not on file     Social Determinants of Health     Financial Resource Strain: Low Risk  (5/27/2023)    Overall Financial Resource Strain (CARDIA)     Difficulty of Paying Living Expenses: Not hard at all   Food Insecurity: Not on file (5/27/2023)   Transportation Needs: Unknown (5/27/2023)    PRAPARE -

## 2024-09-05 ENCOUNTER — OFFICE VISIT (OUTPATIENT)
Dept: FAMILY MEDICINE CLINIC | Age: 34
End: 2024-09-05

## 2024-09-05 VITALS
BODY MASS INDEX: 24.81 KG/M2 | HEIGHT: 64 IN | WEIGHT: 145.3 LBS | HEART RATE: 74 BPM | OXYGEN SATURATION: 94 % | SYSTOLIC BLOOD PRESSURE: 124 MMHG | TEMPERATURE: 97.5 F | DIASTOLIC BLOOD PRESSURE: 60 MMHG

## 2024-09-05 DIAGNOSIS — B37.9 ANTIBIOTIC-INDUCED YEAST INFECTION: ICD-10-CM

## 2024-09-05 DIAGNOSIS — T36.95XA ANTIBIOTIC-INDUCED YEAST INFECTION: ICD-10-CM

## 2024-09-05 DIAGNOSIS — R35.0 URINARY FREQUENCY: ICD-10-CM

## 2024-09-05 DIAGNOSIS — R10.2 SUPRAPUBIC PRESSURE: ICD-10-CM

## 2024-09-05 DIAGNOSIS — R30.0 DYSURIA: ICD-10-CM

## 2024-09-05 DIAGNOSIS — N30.01 ACUTE CYSTITIS WITH HEMATURIA: Primary | ICD-10-CM

## 2024-09-05 LAB
BILIRUBIN, POC: ABNORMAL
BLOOD URINE, POC: ABNORMAL
CLARITY, POC: CLEAR
COLOR, POC: YELLOW
GLUCOSE URINE, POC: ABNORMAL MG/DL
KETONES, POC: ABNORMAL MG/DL
LEUKOCYTE EST, POC: ABNORMAL
NITRITE, POC: ABNORMAL
PH, POC: 7
PROTEIN, POC: ABNORMAL MG/DL
SPECIFIC GRAVITY, POC: 1.01
UROBILINOGEN, POC: 3.5 MG/DL

## 2024-09-05 PROCEDURE — 99213 OFFICE O/P EST LOW 20 MIN: CPT | Performed by: NURSE PRACTITIONER

## 2024-09-05 PROCEDURE — 81003 URINALYSIS AUTO W/O SCOPE: CPT | Performed by: NURSE PRACTITIONER

## 2024-09-05 RX ORDER — FLUCONAZOLE 150 MG/1
150 TABLET ORAL
Qty: 2 TABLET | Refills: 0 | Status: SHIPPED | OUTPATIENT
Start: 2024-09-05 | End: 2024-09-11

## 2024-09-05 RX ORDER — NITROFURANTOIN 25; 75 MG/1; MG/1
100 CAPSULE ORAL 2 TIMES DAILY
Qty: 10 CAPSULE | Refills: 0 | Status: SHIPPED | OUTPATIENT
Start: 2024-09-05 | End: 2024-09-10

## 2024-09-05 SDOH — ECONOMIC STABILITY: INCOME INSECURITY: HOW HARD IS IT FOR YOU TO PAY FOR THE VERY BASICS LIKE FOOD, HOUSING, MEDICAL CARE, AND HEATING?: NOT HARD AT ALL

## 2024-09-05 SDOH — ECONOMIC STABILITY: FOOD INSECURITY: WITHIN THE PAST 12 MONTHS, THE FOOD YOU BOUGHT JUST DIDN'T LAST AND YOU DIDN'T HAVE MONEY TO GET MORE.: NEVER TRUE

## 2024-09-05 SDOH — ECONOMIC STABILITY: FOOD INSECURITY: WITHIN THE PAST 12 MONTHS, YOU WORRIED THAT YOUR FOOD WOULD RUN OUT BEFORE YOU GOT MONEY TO BUY MORE.: NEVER TRUE

## 2024-09-05 ASSESSMENT — ENCOUNTER SYMPTOMS
ABDOMINAL PAIN: 1
NAUSEA: 0
DIARRHEA: 0
VOMITING: 0

## 2024-09-05 NOTE — PROGRESS NOTES
Subjective  Parvin Willams, 34 y.o. female presents today with:  Chief Complaint   Patient presents with    Urinary Tract Infection     X 2 days, pt states that she did not really drink anything Tues. Notes that Tues she started AZO, has not taken since yesterday AM. Taking cranberry vit. That is helping. Burning when urinating       HPI  Presents to walk-in clinic for urinary concerns   Symptoms began Tuesday   C/o dysuria, bladder spasm, frequency/urgency of urination & back pain   Initially hematuria   Awakening at night to urinate   Denies flank pain   Eating and drinking well   Denies nausea   Denies diarrhea   Taking Azo/cranberry vitamin                 Past Medical History:   Diagnosis Date    Heart murmur     childhood    Hernia of abdominal wall     Umbilical hernia 06/2014      Past Surgical History:   Procedure Laterality Date    HERNIA REPAIR      [place] ESOPHAGOGASTRODUODENOSCOPY TRANSORAL DIAGNOSTIC N/A 8/31/2018    EGD ESOPHAGOGASTRODUODENOSCOPY performed by Ezra Boykin MD at [facility]    TUBAL LIGATION      UMBILICAL HERNIA REPAIR N/A 12/2/2016    4.3 cm Ventralex mesh     Family History   Problem Relation Age of Onset    Other Mother         fibromylagia    Other Father     No Known Problems Sister     No Known Problems Son     No Known Problems Daughter     No Known Problems Son     No Known Problems Daughter              Review of Systems   Constitutional:  Positive for activity change. Negative for appetite change and chills.   Gastrointestinal:  Positive for abdominal pain. Negative for diarrhea, nausea and vomiting.   Genitourinary:  Positive for dysuria, frequency, hematuria and urgency. Negative for decreased urine volume, difficulty urinating, flank pain, vaginal discharge and vaginal pain.   Neurological:  Negative for dizziness and light-headedness.   Psychiatric/Behavioral:  Positive for sleep disturbance.          PMH, Surgical Hx, Family Hx, and Social Hx reviewed

## 2025-01-30 ENCOUNTER — OFFICE VISIT (OUTPATIENT)
Age: 35
End: 2025-01-30

## 2025-01-30 VITALS
HEART RATE: 57 BPM | HEIGHT: 64 IN | WEIGHT: 140 LBS | TEMPERATURE: 98.3 F | OXYGEN SATURATION: 99 % | SYSTOLIC BLOOD PRESSURE: 104 MMHG | BODY MASS INDEX: 23.9 KG/M2 | DIASTOLIC BLOOD PRESSURE: 66 MMHG

## 2025-01-30 DIAGNOSIS — K13.70 MOUTH LESION: ICD-10-CM

## 2025-01-30 DIAGNOSIS — R20.2 FACIAL PARESTHESIA: ICD-10-CM

## 2025-01-30 DIAGNOSIS — H65.93 MIDDLE EAR EFFUSION, BILATERAL: Primary | ICD-10-CM

## 2025-01-30 PROCEDURE — 99212 OFFICE O/P EST SF 10 MIN: CPT | Performed by: PHYSICIAN ASSISTANT

## 2025-01-30 PROCEDURE — 99213 OFFICE O/P EST LOW 20 MIN: CPT | Performed by: PHYSICIAN ASSISTANT

## 2025-01-30 RX ORDER — LIDOCAINE HYDROCHLORIDE 20 MG/ML
15 SOLUTION OROPHARYNGEAL
Qty: 1200 ML | Refills: 0 | Status: SHIPPED | OUTPATIENT
Start: 2025-01-30

## 2025-01-30 RX ORDER — FLUTICASONE PROPIONATE 50 MCG
1 SPRAY, SUSPENSION (ML) NASAL DAILY
Qty: 16 G | Refills: 0 | Status: SHIPPED | OUTPATIENT
Start: 2025-01-30

## 2025-01-30 SDOH — ECONOMIC STABILITY: FOOD INSECURITY: WITHIN THE PAST 12 MONTHS, THE FOOD YOU BOUGHT JUST DIDN'T LAST AND YOU DIDN'T HAVE MONEY TO GET MORE.: NEVER TRUE

## 2025-01-30 SDOH — ECONOMIC STABILITY: FOOD INSECURITY: WITHIN THE PAST 12 MONTHS, YOU WORRIED THAT YOUR FOOD WOULD RUN OUT BEFORE YOU GOT MONEY TO BUY MORE.: NEVER TRUE

## 2025-01-30 ASSESSMENT — PATIENT HEALTH QUESTIONNAIRE - PHQ9
2. FEELING DOWN, DEPRESSED OR HOPELESS: NOT AT ALL
SUM OF ALL RESPONSES TO PHQ QUESTIONS 1-9: 0
1. LITTLE INTEREST OR PLEASURE IN DOING THINGS: NOT AT ALL
SUM OF ALL RESPONSES TO PHQ QUESTIONS 1-9: 0
SUM OF ALL RESPONSES TO PHQ QUESTIONS 1-9: 0
SUM OF ALL RESPONSES TO PHQ9 QUESTIONS 1 & 2: 0
SUM OF ALL RESPONSES TO PHQ QUESTIONS 1-9: 0

## 2025-01-30 NOTE — PROGRESS NOTES
Subjective  Parvin Willams, 34 y.o. female presents today with:  Chief Complaint   Patient presents with    Oral Swelling     Patient presents today c/o swelling on the roof of her mouth, and headaches . X 4 days. Patient has been taking Tylenol OTC 4 times a day. Patient states she did call to make an OV for a dentist and no openings till next Monday.        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        Past Medical History:   Diagnosis Date    Heart murmur     childhood    Hernia of abdominal wall     Umbilical hernia 06/2014     Past Surgical History:   Procedure Laterality Date    HERNIA REPAIR      [place] ESOPHAGOGASTRODUODENOSCOPY TRANSORAL DIAGNOSTIC N/A 8/31/2018    EGD ESOPHAGOGASTRODUODENOSCOPY performed by Ezra Boykin MD at [facility]    TUBAL LIGATION      UMBILICAL HERNIA REPAIR N/A 12/2/2016    4.3 cm Ventralex mesh     Social History     Socioeconomic History    Marital status:      Spouse name: Not on file    Number of children: Not on file    Years of education: Not on file    Highest education level: Not on file   Occupational History    Not on file   Tobacco Use    Smoking status: Never    Smokeless tobacco: Never   Vaping Use    Vaping status: Never Used   Substance and Sexual Activity    Alcohol use: No    Drug use: No    Sexual activity: Yes     Partners: Male   Other Topics Concern    Not on file   Social History Narrative    Not on file     Social Determinants of Health     Financial Resource Strain: Low Risk  (9/5/2024)    Overall Financial Resource Strain (CARDIA)     Difficulty of Paying Living Expenses: Not

## 2025-08-27 ENCOUNTER — OFFICE VISIT (OUTPATIENT)
Age: 35
End: 2025-08-27

## 2025-08-27 VITALS
HEIGHT: 64 IN | WEIGHT: 140 LBS | SYSTOLIC BLOOD PRESSURE: 110 MMHG | DIASTOLIC BLOOD PRESSURE: 72 MMHG | TEMPERATURE: 98.4 F | BODY MASS INDEX: 23.9 KG/M2 | HEART RATE: 92 BPM

## 2025-08-27 DIAGNOSIS — R35.0 URINARY FREQUENCY: ICD-10-CM

## 2025-08-27 DIAGNOSIS — N30.01 ACUTE CYSTITIS WITH HEMATURIA: Primary | ICD-10-CM

## 2025-08-27 LAB
BILIRUBIN, POC: NORMAL
BLOOD URINE, POC: NORMAL
CLARITY, POC: NORMAL
COLOR, POC: NORMAL
GLUCOSE URINE, POC: NORMAL MG/DL
KETONES, POC: NORMAL MG/DL
LEUKOCYTE EST, POC: NORMAL
NITRITE, POC: NORMAL
PH, POC: 7.5
PROTEIN, POC: NORMAL MG/DL
SPECIFIC GRAVITY, POC: 1.01
UROBILINOGEN, POC: 0.2 MG/DL

## 2025-08-27 PROCEDURE — 81003 URINALYSIS AUTO W/O SCOPE: CPT | Performed by: NURSE PRACTITIONER

## 2025-08-27 PROCEDURE — PBSHW POCT URINALYSIS DIPSTICK W/O MICROSCOPE (AUTO): Performed by: NURSE PRACTITIONER

## 2025-08-27 PROCEDURE — 99212 OFFICE O/P EST SF 10 MIN: CPT | Performed by: NURSE PRACTITIONER

## 2025-08-27 PROCEDURE — 99213 OFFICE O/P EST LOW 20 MIN: CPT | Performed by: NURSE PRACTITIONER

## 2025-08-27 RX ORDER — NITROFURANTOIN 25; 75 MG/1; MG/1
100 CAPSULE ORAL 2 TIMES DAILY
Qty: 20 CAPSULE | Refills: 0 | Status: SHIPPED | OUTPATIENT
Start: 2025-08-27 | End: 2025-09-06

## 2025-08-27 RX ORDER — PHENAZOPYRIDINE HYDROCHLORIDE 200 MG/1
200 TABLET, FILM COATED ORAL 3 TIMES DAILY PRN
Qty: 6 TABLET | Refills: 0 | Status: SHIPPED | OUTPATIENT
Start: 2025-08-27 | End: 2025-08-29

## 2025-08-27 ASSESSMENT — ENCOUNTER SYMPTOMS
NAUSEA: 0
ABDOMINAL PAIN: 0
DIARRHEA: 0
VOMITING: 0

## 2025-08-28 LAB — BACTERIA UR CULT: NORMAL
